# Patient Record
Sex: MALE | Race: WHITE | Employment: FULL TIME
[De-identification: names, ages, dates, MRNs, and addresses within clinical notes are randomized per-mention and may not be internally consistent; named-entity substitution may affect disease eponyms.]

---

## 2022-03-18 PROBLEM — N30.90 CYSTITIS: Status: ACTIVE | Noted: 2017-10-18

## 2022-03-19 PROBLEM — U07.1 COVID-19: Status: ACTIVE | Noted: 2021-08-12

## 2022-03-19 PROBLEM — F33.0 DEPRESSION, MAJOR, RECURRENT, MILD (HCC): Status: ACTIVE | Noted: 2020-11-19

## 2022-03-19 PROBLEM — R55 NEAR SYNCOPE: Status: ACTIVE | Noted: 2018-12-17

## 2022-03-19 PROBLEM — Z20.822 SUSPECTED COVID-19 VIRUS INFECTION: Status: ACTIVE | Noted: 2021-08-12

## 2022-03-19 PROBLEM — F34.1 DYSTHYMIA: Status: ACTIVE | Noted: 2020-03-19

## 2022-03-19 PROBLEM — K13.0 ANGULAR CHEILITIS: Status: ACTIVE | Noted: 2018-06-13

## 2022-03-19 PROBLEM — N52.9 ERECTILE DYSFUNCTION: Status: ACTIVE | Noted: 2020-01-08

## 2022-03-19 PROBLEM — R79.89 LOW TESTOSTERONE IN MALE: Status: ACTIVE | Noted: 2020-02-05

## 2022-03-19 PROBLEM — M25.531 RIGHT WRIST PAIN: Status: ACTIVE | Noted: 2021-05-27

## 2022-03-19 PROBLEM — F33.42 MDD (MAJOR DEPRESSIVE DISORDER), RECURRENT, IN FULL REMISSION (HCC): Status: ACTIVE | Noted: 2021-11-23

## 2022-03-19 PROBLEM — D23.61: Status: ACTIVE | Noted: 2021-05-27

## 2022-03-20 PROBLEM — I10 BENIGN ESSENTIAL HTN: Status: ACTIVE | Noted: 2018-06-13

## 2022-03-20 PROBLEM — E66.3 OVERWEIGHT (BMI 25.0-29.9): Status: ACTIVE | Noted: 2018-12-17

## 2022-05-26 ENCOUNTER — TELEPHONE (OUTPATIENT)
Dept: FAMILY MEDICINE CLINIC | Facility: CLINIC | Age: 47
End: 2022-05-26

## 2022-05-26 DIAGNOSIS — F33.0 DEPRESSION, MAJOR, RECURRENT, MILD (HCC): Primary | ICD-10-CM

## 2022-05-26 DIAGNOSIS — I10 ESSENTIAL (PRIMARY) HYPERTENSION: ICD-10-CM

## 2022-05-26 RX ORDER — CHLORTHALIDONE 25 MG/1
TABLET ORAL
Qty: 90 TABLET | Refills: 1 | OUTPATIENT
Start: 2022-05-26

## 2022-05-26 NOTE — TELEPHONE ENCOUNTER
Sure, I'll call it in now and we can talk about it next week. Just confirm it's lexapro and not prozac, because it looks like I had him on prozac most recently.

## 2022-05-26 NOTE — TELEPHONE ENCOUNTER
Patient called stating Wellbutrin was not working. He has been crying more and his erectile dysfunction has not gotten any better. Would like to know if he could try Lexpro again . Has a follow up scheduled for 5/31.

## 2022-05-27 ENCOUNTER — OFFICE VISIT (OUTPATIENT)
Dept: FAMILY MEDICINE CLINIC | Facility: CLINIC | Age: 47
End: 2022-05-27
Payer: COMMERCIAL

## 2022-05-27 VITALS
WEIGHT: 184 LBS | SYSTOLIC BLOOD PRESSURE: 135 MMHG | HEIGHT: 69 IN | BODY MASS INDEX: 27.25 KG/M2 | HEART RATE: 85 BPM | DIASTOLIC BLOOD PRESSURE: 85 MMHG

## 2022-05-27 DIAGNOSIS — F33.1 MODERATE EPISODE OF RECURRENT MAJOR DEPRESSIVE DISORDER (HCC): Primary | ICD-10-CM

## 2022-05-27 DIAGNOSIS — F68.8 PERSONALITY CHANGE: ICD-10-CM

## 2022-05-27 DIAGNOSIS — F51.04 PSYCHOPHYSIOLOGIC INSOMNIA: ICD-10-CM

## 2022-05-27 DIAGNOSIS — N52.9 ERECTILE DYSFUNCTION, UNSPECIFIED ERECTILE DYSFUNCTION TYPE: ICD-10-CM

## 2022-05-27 DIAGNOSIS — I10 PRIMARY HYPERTENSION: ICD-10-CM

## 2022-05-27 DIAGNOSIS — E29.1 HYPOGONADISM MALE: ICD-10-CM

## 2022-05-27 PROCEDURE — 99214 OFFICE O/P EST MOD 30 MIN: CPT | Performed by: FAMILY MEDICINE

## 2022-05-27 RX ORDER — TRAZODONE HYDROCHLORIDE 100 MG/1
100 TABLET ORAL NIGHTLY PRN
Qty: 90 TABLET | Refills: 1 | Status: SHIPPED | OUTPATIENT
Start: 2022-05-27

## 2022-05-27 RX ORDER — ESCITALOPRAM OXALATE 10 MG/1
10 TABLET ORAL DAILY
Qty: 30 TABLET | Refills: 3 | Status: SHIPPED | OUTPATIENT
Start: 2022-05-27 | End: 2022-08-18

## 2022-05-27 ASSESSMENT — PATIENT HEALTH QUESTIONNAIRE - PHQ9
3. TROUBLE FALLING OR STAYING ASLEEP: 2
10. IF YOU CHECKED OFF ANY PROBLEMS, HOW DIFFICULT HAVE THESE PROBLEMS MADE IT FOR YOU TO DO YOUR WORK, TAKE CARE OF THINGS AT HOME, OR GET ALONG WITH OTHER PEOPLE: 1
7. TROUBLE CONCENTRATING ON THINGS, SUCH AS READING THE NEWSPAPER OR WATCHING TELEVISION: 2
SUM OF ALL RESPONSES TO PHQ QUESTIONS 1-9: 16
2. FEELING DOWN, DEPRESSED OR HOPELESS: 2
8. MOVING OR SPEAKING SO SLOWLY THAT OTHER PEOPLE COULD HAVE NOTICED. OR THE OPPOSITE, BEING SO FIGETY OR RESTLESS THAT YOU HAVE BEEN MOVING AROUND A LOT MORE THAN USUAL: 2
4. FEELING TIRED OR HAVING LITTLE ENERGY: 2
6. FEELING BAD ABOUT YOURSELF - OR THAT YOU ARE A FAILURE OR HAVE LET YOURSELF OR YOUR FAMILY DOWN: 2
SUM OF ALL RESPONSES TO PHQ QUESTIONS 1-9: 16
SUM OF ALL RESPONSES TO PHQ QUESTIONS 1-9: 16
5. POOR APPETITE OR OVEREATING: 2
SUM OF ALL RESPONSES TO PHQ QUESTIONS 1-9: 16
9. THOUGHTS THAT YOU WOULD BE BETTER OFF DEAD, OR OF HURTING YOURSELF: 0
1. LITTLE INTEREST OR PLEASURE IN DOING THINGS: 2
SUM OF ALL RESPONSES TO PHQ9 QUESTIONS 1 & 2: 4

## 2022-05-27 ASSESSMENT — ENCOUNTER SYMPTOMS
SHORTNESS OF BREATH: 0
ABDOMINAL PAIN: 0
CHEST TIGHTNESS: 0
BLOOD IN STOOL: 0

## 2022-05-27 NOTE — PROGRESS NOTES
[place]  _______________________________________  MD Nba Munoz DO Gwenlyn Brush, NP Verlene Kiang, [place]    [address] [facility][note 3], [address] [place] [address]  Phone: [phone]  Fax: [phone]    Radha Henderson (:  1975) is a 41 y.o. male,Established patient, here for evaluation of the following chief complaint(s):  Hypertension and Depression (would like to dicuss changing medication )         ASSESSMENT/PLAN:  1. Moderate episode of recurrent major depressive disorder ([address]  Will get him back on Lexapro and also add trazoadone at night to help with sleep. He's going through a lot. With his constellation of personality changes and his gonadotropic axis abnormalities, I think it's reasonable to also get one MRI of his brain to make sure there is not something going on there that would tie this all together. I explained to him it's a low chance of finding anything but we agree we'd rather be 100% sure. - MRI BRAIN WO CONTRAST; Future  - escitalopram (LEXAPRO) 10 MG tablet; Take 1 tablet by mouth daily  Dispense: 30 tablet; Refill: 3    2. Personality change    - MRI BRAIN WO CONTRAST; Future    3. Erectile dysfunction, unspecified erectile dysfunction type  He will continue T supplementation through urology. 4. Primary hypertension  Stop HTN meds, will recheck on FU. 5. Hypogonadism male    - MRI BRAIN WO CONTRAST; Future    6. Psychophysiologic insomnia    - traZODone (DESYREL) 100 MG tablet; Take 1 tablet by mouth nightly as needed for Sleep  Dispense: 90 tablet; Refill: 1    FU 4w    Subjective   SUBJECTIVE/OBJECTIVE:    Pt with continuing depression struggles in the setting of HTN and ED. We hat switched him off SSRI prozac to Wellbutrin because of sexual side effects. This did not improve bedroom function and his mood was worse on Wellbutrin. He ended up seeing urology and trialed on trimix for the ED.  His wife is not really in to that. There has been a lot of stress int he marriage recently. States Wellbutrin made him overly emotional, having crying spells if just a sad song came on. His wife notes that he did best on Lexapro which he was on years ago. PHQ-9 Total Score: 16 (5/27/2022  8:17 AM)  Thoughts that you would be better off dead, or of hurting yourself in some way: 0 (5/27/2022  8:17 AM)    Vitals:    05/27/22 0815   BP: 135/85   Pulse: 85       BP is controlled on his normal regimen. He has not taken the chlrothalidone in days. He paints a picture of personality and physiologic gonaotropin changes in the last couple years. He denies SI/HI vehemently. Still being treated for low T but is therapeutic. Also having a lot of issues with sleep because of the MDD. Review of Systems   Constitutional: Negative for chills and fever. Respiratory: Negative for chest tightness and shortness of breath. Gastrointestinal: Negative for abdominal pain and blood in stool. Genitourinary: Positive for decreased urine volume. Negative for hematuria. Psychiatric/Behavioral: Positive for dysphoric mood and sleep disturbance. Negative for suicidal ideas. Objective   Physical Exam  Vitals and nursing note reviewed. Constitutional:       General: He is not in acute distress. Appearance: Normal appearance. He is not ill-appearing. HENT:      Head: Normocephalic and atraumatic. Right Ear: External ear normal.      Left Ear: External ear normal.      Mouth/Throat:      Mouth: Mucous membranes are moist.   Eyes:      General: No scleral icterus. Right eye: No discharge. Left eye: No discharge. Extraocular Movements: Extraocular movements intact. Pupils: Pupils are equal, round, and reactive to light. Comments: No visual field defects by confrontation   Cardiovascular:      Rate and Rhythm: Normal rate and regular rhythm. Pulses: Normal pulses.       Heart sounds: No murmur heard.  No friction rub. No gallop. Pulmonary:      Effort: Pulmonary effort is normal. No respiratory distress. Breath sounds: Normal breath sounds. Abdominal:      General: Abdomen is flat. Bowel sounds are normal.      Palpations: Abdomen is soft. Musculoskeletal:         General: No swelling or tenderness. Normal range of motion. Cervical back: Normal range of motion and neck supple. No rigidity. Right lower leg: No edema. Left lower leg: No edema. Skin:     General: Skin is warm and dry. Coloration: Skin is not pale. Neurological:      General: No focal deficit present. Mental Status: He is alert and oriented to person, place, and time. Mental status is at baseline. Psychiatric:         Mood and Affect: Mood normal.         Behavior: Behavior normal.         Thought Content: Thought content normal.                  An electronic signature was used to authenticate this note.     --Renetta Louis MD

## 2022-06-06 ENCOUNTER — HOSPITAL ENCOUNTER (OUTPATIENT)
Dept: MRI IMAGING | Age: 47
Discharge: HOME | End: 2022-06-09
Payer: COMMERCIAL

## 2022-06-06 DIAGNOSIS — F68.8 PERSONALITY CHANGE: ICD-10-CM

## 2022-06-06 DIAGNOSIS — E29.1 HYPOGONADISM MALE: ICD-10-CM

## 2022-06-06 DIAGNOSIS — F33.1 MODERATE EPISODE OF RECURRENT MAJOR DEPRESSIVE DISORDER (HCC): ICD-10-CM

## 2022-06-06 PROCEDURE — 70551 MRI BRAIN STEM W/O DYE: CPT

## 2022-06-22 ENCOUNTER — OFFICE VISIT (OUTPATIENT)
Dept: FAMILY MEDICINE CLINIC | Facility: CLINIC | Age: 47
End: 2022-06-22
Payer: COMMERCIAL

## 2022-06-22 VITALS
BODY MASS INDEX: 26.59 KG/M2 | RESPIRATION RATE: 14 BRPM | SYSTOLIC BLOOD PRESSURE: 128 MMHG | HEART RATE: 87 BPM | WEIGHT: 179.5 LBS | HEIGHT: 69 IN | DIASTOLIC BLOOD PRESSURE: 74 MMHG | OXYGEN SATURATION: 99 %

## 2022-06-22 DIAGNOSIS — R04.0 EPISTAXIS: ICD-10-CM

## 2022-06-22 DIAGNOSIS — I10 ESSENTIAL (PRIMARY) HYPERTENSION: ICD-10-CM

## 2022-06-22 DIAGNOSIS — F51.04 PSYCHOPHYSIOLOGIC INSOMNIA: ICD-10-CM

## 2022-06-22 DIAGNOSIS — F33.1 MODERATE EPISODE OF RECURRENT MAJOR DEPRESSIVE DISORDER (HCC): Primary | ICD-10-CM

## 2022-06-22 PROCEDURE — 99214 OFFICE O/P EST MOD 30 MIN: CPT | Performed by: FAMILY MEDICINE

## 2022-06-22 ASSESSMENT — PATIENT HEALTH QUESTIONNAIRE - PHQ9
3. TROUBLE FALLING OR STAYING ASLEEP: 0
SUM OF ALL RESPONSES TO PHQ QUESTIONS 1-9: 0
10. IF YOU CHECKED OFF ANY PROBLEMS, HOW DIFFICULT HAVE THESE PROBLEMS MADE IT FOR YOU TO DO YOUR WORK, TAKE CARE OF THINGS AT HOME, OR GET ALONG WITH OTHER PEOPLE: 0
6. FEELING BAD ABOUT YOURSELF - OR THAT YOU ARE A FAILURE OR HAVE LET YOURSELF OR YOUR FAMILY DOWN: 0
SUM OF ALL RESPONSES TO PHQ QUESTIONS 1-9: 0
4. FEELING TIRED OR HAVING LITTLE ENERGY: 0
7. TROUBLE CONCENTRATING ON THINGS, SUCH AS READING THE NEWSPAPER OR WATCHING TELEVISION: 0
9. THOUGHTS THAT YOU WOULD BE BETTER OFF DEAD, OR OF HURTING YOURSELF: 0
8. MOVING OR SPEAKING SO SLOWLY THAT OTHER PEOPLE COULD HAVE NOTICED. OR THE OPPOSITE, BEING SO FIGETY OR RESTLESS THAT YOU HAVE BEEN MOVING AROUND A LOT MORE THAN USUAL: 0
SUM OF ALL RESPONSES TO PHQ QUESTIONS 1-9: 0
5. POOR APPETITE OR OVEREATING: 0
SUM OF ALL RESPONSES TO PHQ QUESTIONS 1-9: 0
2. FEELING DOWN, DEPRESSED OR HOPELESS: 0

## 2022-06-22 ASSESSMENT — ENCOUNTER SYMPTOMS
SHORTNESS OF BREATH: 0
ABDOMINAL PAIN: 0
BLOOD IN STOOL: 0
CHEST TIGHTNESS: 0

## 2022-06-22 NOTE — PROGRESS NOTES
[place]  _______________________________________  MD Tony Johns, LAUREN Zepeda MD Charleen Shoals, [facility] [address]  Phone: [phone]  Fax: [phone]    Baldomero Ferro (:  1975) is a 41 y.o. male,Established patient, here for evaluation of the following chief complaint(s):  Discuss Medications (pt states he likes his new meds and feels better)         ASSESSMENT/PLAN:    1. Moderate episode of recurrent major depressive disorder (HCC)  Doing much better, continue counseling, no med changes for now. Recheck in 6m. 2. Psychophysiologic insomnia  Stable, continue current regimen. 3. Essential (primary) hypertension  Off meds, BP nominal. Will monitor. 4. Epistaxis  Asked him to start using bedside humidifier. Showed him how to staunch bleeding. To ER with bleeding that won't stop. Sending to ENT for full nasal scope and possible cautery. Appreciate their help.   - [address], [place]     FU 6m    Subjective   SUBJECTIVE/OBJECTIVE:    May '22:  Pt with continuing depression struggles in the setting of HTN and ED. We hat switched him off SSRI prozac to Wellbutrin because of sexual side effects. This did not improve bedroom function and his mood was worse on Wellbutrin. He ended up seeing urology and trialed on trimix for the ED. His wife is not really in to that. There has been a lot of stress int he marriage recently. States Wellbutrin made him overly emotional, having crying spells if just a sad song came on. His wife notes that he did best on Lexapro which he was on years ago. Today:   We added back lexapro and also put him on Trazodone 100 a tnight to help with sleep. He is feeling much better. BP remains controlled off meds.      MRI of the brain was normal.     PHQ-9 Total Score: 0 (2022  3:09 PM)  Thoughts that you would be better off dead, or of hurting yourself in some way: 0 (6/22/2022  3:09 PM)    Vitals:    06/22/22 1511   BP: 128/74   Pulse: 87   Resp: 14   SpO2: 99%       BP is controlled on his normal regimen. He has not taken the chlrothalidone in days. He paints a picture of personality and physiologic gonaotropin changes in the last couple years. He denies SI/HI vehemently. Still being treated for low T but is therapeutic. Also having a lot of issues with sleep because of the MDD. Epistaxis: Had heat exhaustion in a container he was unloaded that was 130F. Had a major nosebleed when this happened. Review of Systems   Constitutional: Negative for chills and fever. HENT: Positive for nosebleeds. Respiratory: Negative for chest tightness and shortness of breath. Gastrointestinal: Negative for abdominal pain and blood in stool. Genitourinary: Negative for hematuria. Psychiatric/Behavioral: Negative for dysphoric mood, sleep disturbance and suicidal ideas. Objective   Physical Exam  Vitals and nursing note reviewed. Constitutional:       General: He is not in acute distress. Appearance: Normal appearance. He is not ill-appearing. HENT:      Head: Normocephalic and atraumatic. Right Ear: External ear normal.      Left Ear: External ear normal.      Nose:      Comments: Dried blood BL nares as far up as I can see. Visible nasal septum is friable     Mouth/Throat:      Mouth: Mucous membranes are moist.   Eyes:      General: No scleral icterus. Right eye: No discharge. Left eye: No discharge. Extraocular Movements: Extraocular movements intact. Pupils: Pupils are equal, round, and reactive to light. Cardiovascular:      Rate and Rhythm: Normal rate and regular rhythm. Pulses: Normal pulses. Heart sounds: No murmur heard. No friction rub. No gallop. Pulmonary:      Effort: Pulmonary effort is normal. No respiratory distress. Breath sounds: Normal breath sounds. Abdominal:      General: Abdomen is flat. Bowel sounds are normal.      Palpations: Abdomen is soft. Musculoskeletal:         General: No swelling or tenderness. Normal range of motion. Cervical back: Normal range of motion and neck supple. No rigidity. Right lower leg: No edema. Left lower leg: No edema. Skin:     General: Skin is warm and dry. Coloration: Skin is not pale. Neurological:      General: No focal deficit present. Mental Status: He is alert and oriented to person, place, and time. Mental status is at baseline. Psychiatric:         Mood and Affect: Mood normal.         Behavior: Behavior normal.         Thought Content: Thought content normal.                  An electronic signature was used to authenticate this note.     --Rajani Berumen MD

## 2022-07-05 ENCOUNTER — TELEPHONE (OUTPATIENT)
Dept: FAMILY MEDICINE CLINIC | Facility: CLINIC | Age: 47
End: 2022-07-05

## 2022-07-05 NOTE — TELEPHONE ENCOUNTER
Absolutely,     If that is the dose that ends up working, let us know so we can send in the appropriate number of pills on his next refill. Thanks.

## 2022-07-14 ENCOUNTER — OFFICE VISIT (OUTPATIENT)
Dept: ENT CLINIC | Age: 47
End: 2022-07-14
Payer: COMMERCIAL

## 2022-07-14 VITALS
DIASTOLIC BLOOD PRESSURE: 80 MMHG | SYSTOLIC BLOOD PRESSURE: 110 MMHG | WEIGHT: 182.2 LBS | HEIGHT: 69 IN | BODY MASS INDEX: 26.98 KG/M2

## 2022-07-14 DIAGNOSIS — J34.2 DEVIATED NASAL SEPTUM: ICD-10-CM

## 2022-07-14 DIAGNOSIS — R04.0 EPISTAXIS: Primary | ICD-10-CM

## 2022-07-14 PROCEDURE — 99204 OFFICE O/P NEW MOD 45 MIN: CPT | Performed by: PHYSICIAN ASSISTANT

## 2022-07-14 PROCEDURE — 31231 NASAL ENDOSCOPY DX: CPT | Performed by: PHYSICIAN ASSISTANT

## 2022-07-14 ASSESSMENT — ENCOUNTER SYMPTOMS
ABDOMINAL PAIN: 0
EYE DISCHARGE: 0
COUGH: 0

## 2022-07-14 NOTE — PROGRESS NOTES
Chief Complaint   Patient presents with    New Patient     Patient here for nose bleeds.  Epistaxis       HPI:  [place] is a 41 y.o. male seen for evaluation of epistaxis. He reports that this started 3 weeks ago. He states that there was an incident in which he was unloading a greater than 100 degree trailer and he was about to pass out, and he was taken to a cool breakroom. He reports that once he was in the breakroom, he had a bad nose bleed. He states that since that time he has not been able to breath well from the right side of his nose. He states that he has been using flonase on occasion. He denies any direct trauma to the nose. He denies any cautery or packing in the nose. He denies any prior nasal surgery. Past Medical History, Past Surgical History, Family history, Social History, and Medications were all reviewed with the patient today and updated as necessary. Allergies   Allergen Reactions    Lisinopril Other (See Comments)     Large increase (40%) in Creatinine with GFR reduced by 35%.  Penicillin G Other (See Comments)     Told had allergie by parents      Patient Active Problem List   Diagnosis    Allergic rhinitis    Cystitis    Angular cheilitis    COVID-19    Suspected COVID-19 virus infection    Benign neoplasm of skin of right upper extremity    Gastroesophageal reflux disease without esophagitis    Near syncope    Depression, major, recurrent, mild (HCC)    Hx of syncope    Low testosterone in male    Right wrist pain    Dysthymia    MDD (major depressive disorder), recurrent, in full remission ([address]    Erectile dysfunction    Anxiety    Benign essential HTN    Overweight (BMI 25.0-29. 9)    Moderate episode of recurrent major depressive disorder ([address]    Personality change    Essential (primary) hypertension    Hypogonadism male    Psychophysiologic insomnia    Epistaxis     Current Outpatient Medications   Medication Sig    mupirocin (BACTROBAN NASAL) 2 % nasal ointment Apply to the right side of nose 2 times daily for 10 days.  escitalopram (LEXAPRO) 10 MG tablet Take 1 tablet by mouth daily    traZODone (DESYREL) 100 MG tablet Take 1 tablet by mouth nightly as needed for Sleep    testosterone cypionate (DEPOTESTOTERONE CYPIONATE) 200 MG/ML injection INJECT 0.3 ML (60MG) BY INTRAMUSCULAR ROUTE EVERY Monday AND Thursday. MAX DAILY AMOUNT: 60 MG.  Testosterone Enanthate 50 MG/0.5ML SOAJ Inject 0.6 mLs into the muscle Twice a Week     No current facility-administered medications for this visit. Past Medical History:   Diagnosis Date    Erectile dysfunction      Social History     Tobacco Use    Smoking status: Never Smoker    Smokeless tobacco: Never Used   Substance Use Topics    Alcohol use: Yes     Past Surgical History:   Procedure Laterality Date    APPENDECTOMY  1995    CHOLECYSTECTOMY  2008    LAP,CHOLECYSTECTOMY      [place] APPENDECTOMY       Family History   Problem Relation Age of Onset    No Known Problems Brother     Hypertension Father     No Known Problems Mother     Diabetes Father         ROS:    Review of Systems   Constitutional: Negative for fever. HENT: Positive for nosebleeds. Negative for ear discharge and ear pain. Eyes: Negative for discharge. Respiratory: Negative for cough. Cardiovascular: Negative for chest pain. Gastrointestinal: Negative for abdominal pain. Genitourinary: Negative for difficulty urinating. Musculoskeletal: Negative for neck pain. Skin: Negative for rash. Allergic/Immunologic: Negative for environmental allergies. Neurological: Negative for dizziness. Hematological: Negative for adenopathy. Psychiatric/Behavioral: Negative for agitation.            PHYSICAL EXAM:    /80 (Site: Left Upper Arm, Position: Sitting)   Ht 5' 9\" (1.753 m)   Wt 182 lb 3.2 oz (82.6 kg)   BMI 26.91 kg/m²     Head  Head and Face - The head and face are atraumatic, normocephalic. The salivary glands are intact and the facial appearance is symmetric. Head shape - No scars, lesions, or masses    Ear  Ear - Tympanic membranes are clear, the external auditory canal is without discharge and the tympanic membranes are mobile. There is no tympanic membrane erythema and no middle ear opacity is visualized. Pinna: bilateral - No hematomas or lacerations    Eye  Eyeball - bilateral - extraocular motions intact, equal in size and movement    Nose and Sinuses  Nose - mucosa is pink and the septum is deviated. There are no nasal lesions and there was mild turbinate hypertrophy. Mouth and Throat  Lips - upper lip - normal: no dryness, cracking, pallor, cyanosis, or vesicular eruption. Lower lip: normal: no dryness, cracking, pallor, cyanosis, or vesicular eruption. Teeth and Gums - No bleeding, no inflammation or ulceration. Lips - Pink and symmetrical  Oral Cavity - Oral mucosa pink, soft and hard palates contiguous and tongue moist without ulcers. The mucosa is without ulcerations. No oral cavity masses present. Parotid Gland - Bilateral - Non tender, not swollen. Oropharynx - No discharge or Erythema  Nasopharynx - Non obstructed, mucosa pink and moist.    Hypopharynx - No erythema  Submandibular Gland - Non tender, not swollen. Tonsils - Normal    Neck   Neck - Full range of motion and Supple. Non Tender. No Masses. Trachea - Midline. Thyroid - Gland - Symmetric. Non Tender. Nodules - No nodules. Neurologic - II - XII Grossly intact bilaterally    Cardiac  Inspection - Jugular Vein:  Bilateral - non distended, no prominent pulsations    Chest and Lung  Inspection - Movements:  Chest symmetrical with bilateral expansion, respirations even and non labored    Nasal Endoscopy Procedure Note      Indications   A nasal endoscopy will be performed due to epistaxis.     Procedure Details   The risks, benefits, complications, treatment options, and expected outcomes were discussed with the patient. The patient concurred with the proposed plan, giving informed consent. The interior nasal cavity, middle and superior meatus, turbinates and sphenoethmoidal recess were evaluated. Nasal endoscopy was performed today under Rhinocaine was applied topically. Findings  The findings include: the septum was deviated without perforation, the turbinates were hypertrophic, there were no polyps, masses, or mucopurulence identified and prominent vasculature at the anterior right septum with mild irritation and crusting. Patient Response   The patient tolerated the procedure well. .            Procedure Impression  Deviated septum  Turbinate hypertrophy  Epistaxis      Procedure:    Simple Epistaxis Control -  The procedure, risks and benefits were discussed with the patient and they was agreeable to the procedure. The right side of the nose was evaluated and there were prominent blood vessels on the right anterior septum. There is no active bleeding. Silver nitrate was used to cauterize the prominent blood vessels. There was no blood loss. They tolerated this well. ASSESSMENT and PLAN      ICD-10-CM    1. Epistaxis  R04.0 NASAL ENDOSCOPY,DX   2. Deviated nasal septum  J34.2        The anterior septum was cauterized, there were no areas of bleeding posteriorly. I will send in Bactroban ointment for the patient to use on the right anterior septum. He was also encouraged to use nasal saline spray 2-3 times a day. He is to avoid blowing his nose for the next 2 weeks. He will return in 2 weeks for reevaluation. Thank you for the opportunity to participate in the care of this patient. Please let me know if you have any further questions or concerns.     Jessa Dasilva PA-C  7/14/2022

## 2022-07-28 ENCOUNTER — OFFICE VISIT (OUTPATIENT)
Dept: ENT CLINIC | Age: 47
End: 2022-07-28
Payer: COMMERCIAL

## 2022-07-28 VITALS — WEIGHT: 184 LBS | BODY MASS INDEX: 27.25 KG/M2 | OXYGEN SATURATION: 98 % | HEIGHT: 69 IN

## 2022-07-28 DIAGNOSIS — J34.3 NASAL TURBINATE HYPERTROPHY: ICD-10-CM

## 2022-07-28 DIAGNOSIS — R04.0 EPISTAXIS: ICD-10-CM

## 2022-07-28 DIAGNOSIS — J34.2 DEVIATED NASAL SEPTUM: Primary | ICD-10-CM

## 2022-07-28 PROCEDURE — 99213 OFFICE O/P EST LOW 20 MIN: CPT | Performed by: PHYSICIAN ASSISTANT

## 2022-07-28 ASSESSMENT — ENCOUNTER SYMPTOMS
EYE DISCHARGE: 0
COUGH: 0
ABDOMINAL PAIN: 0

## 2022-07-28 NOTE — PROGRESS NOTES
Chief Complaint   Patient presents with    Follow-up     Patient here for follow-up on his nose bleed. Patient has had no more nose bleeds . He states that he still can't breathe out of the R side. HPI:  Brayan Gallagher is a 41 y.o. male seen to follow up on his nose. He reports that he has not experienced any nosebleeds since the last visit, but he is not able to breath from the right side of his nose. Patient reports that he has been using nasal saline spray and vaseline in the nose. Past Medical History, Past Surgical History, Family history, Social History, and Medications were all reviewed with the patient today and updated as necessary. Allergies   Allergen Reactions    Lisinopril Other (See Comments)     Large increase (40%) in Creatinine with GFR reduced by 35%. Penicillin G Other (See Comments)     Told had allergie by parents      Patient Active Problem List   Diagnosis    Allergic rhinitis    Cystitis    Angular cheilitis    COVID-19    Suspected COVID-19 virus infection    Benign neoplasm of skin of right upper extremity    Gastroesophageal reflux disease without esophagitis    Near syncope    Depression, major, recurrent, mild (HCC)    Hx of syncope    Low testosterone in male    Right wrist pain    Dysthymia    MDD (major depressive disorder), recurrent, in full remission ([address]    Erectile dysfunction    Anxiety    Benign essential HTN    Overweight (BMI 25.0-29. 9)    Moderate episode of recurrent major depressive disorder ([address]    Personality change    Essential (primary) hypertension    Hypogonadism male    Psychophysiologic insomnia    Epistaxis     Current Outpatient Medications   Medication Sig    mupirocin (BACTROBAN NASAL) 2 % nasal ointment Apply to the right side of nose 2 times daily for 10 days.     escitalopram (LEXAPRO) 10 MG tablet Take 1 tablet by mouth daily    traZODone (DESYREL) 100 MG tablet Take 1 tablet by mouth nightly as needed for Sleep    testosterone cypionate (DEPOTESTOTERONE CYPIONATE) 200 MG/ML injection INJECT 0.3 ML (60MG) BY INTRAMUSCULAR ROUTE EVERY Monday AND Thursday. MAX DAILY AMOUNT: 60 MG. Testosterone Enanthate 50 MG/0.5ML SOAJ Inject 0.6 mLs into the muscle Twice a Week     No current facility-administered medications for this visit. Past Medical History:   Diagnosis Date    Erectile dysfunction      Social History     Tobacco Use    Smoking status: Never    Smokeless tobacco: Never   Substance Use Topics    Alcohol use: Yes     Past Surgical History:   Procedure Laterality Date    APPENDECTOMY  1995    CHOLECYSTECTOMY  2008    LAP,CHOLECYSTECTOMY      [place] APPENDECTOMY       Family History   Problem Relation Age of Onset    No Known Problems Brother     Hypertension Father     No Known Problems Mother     Diabetes Father         ROS:    Review of Systems   Constitutional:  Negative for fever. HENT:  Negative for ear discharge and ear pain. Eyes:  Negative for discharge. Respiratory:  Negative for cough. Cardiovascular:  Negative for chest pain. Gastrointestinal:  Negative for abdominal pain. Genitourinary:  Negative for difficulty urinating. Musculoskeletal:  Negative for neck pain. Skin:  Negative for rash. Allergic/Immunologic: Negative for environmental allergies. Neurological:  Negative for dizziness. Hematological:  Negative for adenopathy. Psychiatric/Behavioral:  Negative for agitation. PHYSICAL EXAM:    Ht 5' 9\" (1.753 m)   Wt 184 lb (83.5 kg)   SpO2 98%   BMI 27.17 kg/m²     Head  Head and Face - The head and face are atraumatic, normocephalic. The salivary glands are intact and the facial appearance is symmetric. Head shape - No scars, lesions, or masses    Ear  Ear - Tympanic membranes are clear, the external auditory canal is without discharge and the tympanic membranes are mobile. There is no tympanic membrane erythema and no middle ear opacity is visualized.     Pinna: bilateral - No

## 2022-08-18 DIAGNOSIS — F33.1 MODERATE EPISODE OF RECURRENT MAJOR DEPRESSIVE DISORDER (HCC): ICD-10-CM

## 2022-08-18 RX ORDER — ESCITALOPRAM OXALATE 10 MG/1
10 TABLET ORAL DAILY
Qty: 90 TABLET | Refills: 1 | Status: SHIPPED | OUTPATIENT
Start: 2022-08-18

## 2022-08-23 DIAGNOSIS — F51.04 PSYCHOPHYSIOLOGIC INSOMNIA: ICD-10-CM

## 2022-08-23 RX ORDER — TRAZODONE HYDROCHLORIDE 100 MG/1
TABLET ORAL
Qty: 90 TABLET | Refills: 1 | OUTPATIENT
Start: 2022-08-23

## 2022-10-10 RX ORDER — BUPROPION HYDROCHLORIDE 150 MG/1
TABLET ORAL
Qty: 90 TABLET | OUTPATIENT
Start: 2022-10-10

## 2022-11-07 ENCOUNTER — TELEPHONE (OUTPATIENT)
Dept: UROLOGY | Age: 47
End: 2022-11-07

## 2022-11-07 DIAGNOSIS — R79.89 LOW TESTOSTERONE: Primary | ICD-10-CM

## 2022-11-08 RX ORDER — TESTOSTERONE CYPIONATE 200 MG/ML
INJECTION INTRAMUSCULAR
Qty: 4 ML | OUTPATIENT
Start: 2022-11-08

## 2022-12-21 ENCOUNTER — OFFICE VISIT (OUTPATIENT)
Dept: FAMILY MEDICINE CLINIC | Facility: CLINIC | Age: 47
End: 2022-12-21
Payer: COMMERCIAL

## 2022-12-21 VITALS
SYSTOLIC BLOOD PRESSURE: 143 MMHG | BODY MASS INDEX: 27.4 KG/M2 | HEIGHT: 69 IN | WEIGHT: 185 LBS | DIASTOLIC BLOOD PRESSURE: 95 MMHG

## 2022-12-21 DIAGNOSIS — K21.9 GASTROESOPHAGEAL REFLUX DISEASE WITHOUT ESOPHAGITIS: ICD-10-CM

## 2022-12-21 DIAGNOSIS — F51.04 PSYCHOPHYSIOLOGIC INSOMNIA: ICD-10-CM

## 2022-12-21 DIAGNOSIS — I10 BENIGN ESSENTIAL HTN: Primary | ICD-10-CM

## 2022-12-21 DIAGNOSIS — F33.1 MODERATE EPISODE OF RECURRENT MAJOR DEPRESSIVE DISORDER (HCC): ICD-10-CM

## 2022-12-21 DIAGNOSIS — I10 BENIGN ESSENTIAL HTN: ICD-10-CM

## 2022-12-21 LAB
ALBUMIN SERPL-MCNC: 4.1 G/DL (ref 3.5–5)
ALBUMIN/GLOB SERPL: 1.2 (ref 0.4–1.6)
ALP SERPL-CCNC: 74 U/L (ref 50–136)
ALT SERPL-CCNC: 29 U/L (ref 12–65)
ANION GAP SERPL CALC-SCNC: 3 MMOL/L (ref 2–11)
AST SERPL-CCNC: 14 U/L (ref 15–37)
BASOPHILS # BLD: 0 K/UL (ref 0–0.2)
BASOPHILS NFR BLD: 0 % (ref 0–2)
BILIRUB SERPL-MCNC: 0.8 MG/DL (ref 0.2–1.1)
BUN SERPL-MCNC: 11 MG/DL (ref 6–23)
CALCIUM SERPL-MCNC: 9 MG/DL (ref 8.3–10.4)
CHLORIDE SERPL-SCNC: 103 MMOL/L (ref 101–110)
CO2 SERPL-SCNC: 33 MMOL/L (ref 21–32)
CREAT SERPL-MCNC: 1.2 MG/DL (ref 0.8–1.5)
DIFFERENTIAL METHOD BLD: ABNORMAL
EOSINOPHIL # BLD: 0.2 K/UL (ref 0–0.8)
EOSINOPHIL NFR BLD: 3 % (ref 0.5–7.8)
ERYTHROCYTE [DISTWIDTH] IN BLOOD BY AUTOMATED COUNT: 12.2 % (ref 11.9–14.6)
GLOBULIN SER CALC-MCNC: 3.5 G/DL (ref 2.8–4.5)
GLUCOSE SERPL-MCNC: 88 MG/DL (ref 65–100)
HCT VFR BLD AUTO: 49.7 % (ref 41.1–50.3)
HGB BLD-MCNC: 16.5 G/DL (ref 13.6–17.2)
IMM GRANULOCYTES # BLD AUTO: 0 K/UL (ref 0–0.5)
IMM GRANULOCYTES NFR BLD AUTO: 0 % (ref 0–5)
LYMPHOCYTES # BLD: 1.4 K/UL (ref 0.5–4.6)
LYMPHOCYTES NFR BLD: 20 % (ref 13–44)
MCH RBC QN AUTO: 30.1 PG (ref 26.1–32.9)
MCHC RBC AUTO-ENTMCNC: 33.2 G/DL (ref 31.4–35)
MCV RBC AUTO: 90.7 FL (ref 82–102)
MONOCYTES # BLD: 0.7 K/UL (ref 0.1–1.3)
MONOCYTES NFR BLD: 10 % (ref 4–12)
NEUTS SEG # BLD: 4.7 K/UL (ref 1.7–8.2)
NEUTS SEG NFR BLD: 67 % (ref 43–78)
NRBC # BLD: 0 K/UL (ref 0–0.2)
PLATELET # BLD AUTO: 256 K/UL (ref 150–450)
PMV BLD AUTO: 9.2 FL (ref 9.4–12.3)
POTASSIUM SERPL-SCNC: 3.8 MMOL/L (ref 3.5–5.1)
PROT SERPL-MCNC: 7.6 G/DL (ref 6.3–8.2)
RBC # BLD AUTO: 5.48 M/UL (ref 4.23–5.6)
SODIUM SERPL-SCNC: 139 MMOL/L (ref 133–143)
WBC # BLD AUTO: 7 K/UL (ref 4.3–11.1)

## 2022-12-21 PROCEDURE — 3074F SYST BP LT 130 MM HG: CPT | Performed by: FAMILY MEDICINE

## 2022-12-21 PROCEDURE — 3078F DIAST BP <80 MM HG: CPT | Performed by: FAMILY MEDICINE

## 2022-12-21 PROCEDURE — 99214 OFFICE O/P EST MOD 30 MIN: CPT | Performed by: FAMILY MEDICINE

## 2022-12-21 RX ORDER — ESCITALOPRAM OXALATE 10 MG/1
10 TABLET ORAL DAILY
Qty: 90 TABLET | Refills: 1 | Status: SHIPPED | OUTPATIENT
Start: 2022-12-21

## 2022-12-21 RX ORDER — TRAZODONE HYDROCHLORIDE 100 MG/1
100 TABLET ORAL NIGHTLY PRN
Qty: 90 TABLET | Refills: 1 | Status: SHIPPED | OUTPATIENT
Start: 2022-12-21

## 2022-12-21 ASSESSMENT — PATIENT HEALTH QUESTIONNAIRE - PHQ9
SUM OF ALL RESPONSES TO PHQ QUESTIONS 1-9: 0
SUM OF ALL RESPONSES TO PHQ9 QUESTIONS 1 & 2: 0
SUM OF ALL RESPONSES TO PHQ QUESTIONS 1-9: 0
7. TROUBLE CONCENTRATING ON THINGS, SUCH AS READING THE NEWSPAPER OR WATCHING TELEVISION: 0
1. LITTLE INTEREST OR PLEASURE IN DOING THINGS: 0
SUM OF ALL RESPONSES TO PHQ QUESTIONS 1-9: 0
5. POOR APPETITE OR OVEREATING: 0
4. FEELING TIRED OR HAVING LITTLE ENERGY: 0
6. FEELING BAD ABOUT YOURSELF - OR THAT YOU ARE A FAILURE OR HAVE LET YOURSELF OR YOUR FAMILY DOWN: 0
3. TROUBLE FALLING OR STAYING ASLEEP: 0
9. THOUGHTS THAT YOU WOULD BE BETTER OFF DEAD, OR OF HURTING YOURSELF: 0
2. FEELING DOWN, DEPRESSED OR HOPELESS: 0
8. MOVING OR SPEAKING SO SLOWLY THAT OTHER PEOPLE COULD HAVE NOTICED. OR THE OPPOSITE, BEING SO FIGETY OR RESTLESS THAT YOU HAVE BEEN MOVING AROUND A LOT MORE THAN USUAL: 0
SUM OF ALL RESPONSES TO PHQ QUESTIONS 1-9: 0

## 2022-12-21 ASSESSMENT — ENCOUNTER SYMPTOMS
CHEST TIGHTNESS: 0
SHORTNESS OF BREATH: 0
ABDOMINAL PAIN: 1
ABDOMINAL DISTENTION: 1
BLOOD IN STOOL: 0

## 2022-12-21 NOTE — PROGRESS NOTES
[place]  _______________________________________  Carmela Cruz MD    May Kay, DO  Kian Tati, MD Zara Falcon MD    [address] , [address] [facility]  Phone: [phone]  Fax: [phone]    Mikayla Pleitez (:  1975) is a 41 y.o. male,Established patient, here for evaluation of the following chief complaint(s):  Depression and Gastroesophageal Reflux         ASSESSMENT/PLAN:  1. Psychophysiologic insomnia  Not sleeping, restart Trazodone. - traZODone (DESYREL) 100 MG tablet; Take 1 tablet by mouth nightly as needed for Sleep  Dispense: 90 tablet; Refill: 1    2. Moderate episode of recurrent major depressive disorder (HCC)  Stable, continue current regimen. He contracts for safety. - escitalopram (LEXAPRO) 10 MG tablet; Take 1 tablet by mouth daily  Dispense: 90 tablet; Refill: 1    3. Benign essential HTN  BP a little high today, but he hasn't slept well in days. When he's sleeping bP is great. Restarting meds for that as above, will trend out BP next OV.   - Comprehensive Metabolic Panel; Future    4. Gastroesophageal reflux disease without esophagitis  Mild, start OTC pepcid. If not controlled in a month switch to Nexium. If that's not helping FU with me. Reviewed signs of GIB with him. - CBC with Auto Differential; Future      FU 6m    Subjective   SUBJECTIVE/OBJECTIVE:    May '22:  Pt with continuing depression struggles in the setting of HTN and ED. We hat switched him off SSRI prozac to Wellbutrin because of sexual side effects. This did not improve bedroom function and his mood was worse on Wellbutrin. He ended up seeing urology and trialed on trimix for the ED. His wife is not really in to that. There has been a lot of stress int he marriage recently. States Wellbutrin made him overly emotional, having crying spells if just a sad song came on. His wife notes that he did best on Lexapro which he was on years ago. pulses. Heart sounds: No murmur heard. No friction rub. No gallop. Pulmonary:      Effort: Pulmonary effort is normal. No respiratory distress. Breath sounds: Normal breath sounds. Abdominal:      General: Abdomen is flat. Bowel sounds are normal.      Palpations: Abdomen is soft. Tenderness: There is abdominal tenderness. Comments: TTP mid epigastrium   Musculoskeletal:         General: No swelling or tenderness. Normal range of motion. Cervical back: Normal range of motion and neck supple. No rigidity. Right lower leg: No edema. Left lower leg: No edema. Skin:     General: Skin is warm and dry. Coloration: Skin is not pale. Neurological:      General: No focal deficit present. Mental Status: He is alert and oriented to person, place, and time. Mental status is at baseline. Psychiatric:         Mood and Affect: Mood normal.         Behavior: Behavior normal.         Thought Content: Thought content normal.                An electronic signature was used to authenticate this note.     --Cristi Leyva MD

## 2023-02-01 DIAGNOSIS — R79.89 LOW TESTOSTERONE: Primary | ICD-10-CM

## 2023-02-02 ENCOUNTER — NURSE ONLY (OUTPATIENT)
Dept: UROLOGY | Age: 48
End: 2023-02-02

## 2023-02-02 DIAGNOSIS — R79.89 LOW TESTOSTERONE: ICD-10-CM

## 2023-02-03 LAB — PSA SERPL-MCNC: 1.1 NG/ML

## 2023-02-07 LAB
TESTOST FREE SERPL-MCNC: 39.2 PG/ML (ref 6.8–21.5)
TESTOST SERPL-MCNC: 926 NG/DL (ref 264–916)

## 2023-03-01 ENCOUNTER — OFFICE VISIT (OUTPATIENT)
Dept: UROLOGY | Age: 48
End: 2023-03-01
Payer: COMMERCIAL

## 2023-03-01 DIAGNOSIS — R79.89 LOW TESTOSTERONE: Primary | ICD-10-CM

## 2023-03-01 DIAGNOSIS — N52.9 ERECTILE DYSFUNCTION, UNSPECIFIED ERECTILE DYSFUNCTION TYPE: ICD-10-CM

## 2023-03-01 LAB
BILIRUBIN, URINE, POC: NEGATIVE
BLOOD URINE, POC: NORMAL
GLUCOSE URINE, POC: NEGATIVE
KETONES, URINE, POC: NEGATIVE
LEUKOCYTE ESTERASE, URINE, POC: NEGATIVE
NITRITE, URINE, POC: NEGATIVE
PH, URINE, POC: 7 (ref 4.6–8)
PROTEIN,URINE, POC: NORMAL
SPECIFIC GRAVITY, URINE, POC: 1.02 (ref 1–1.03)
URINALYSIS CLARITY, POC: NORMAL
URINALYSIS COLOR, POC: NORMAL
UROBILINOGEN, POC: NORMAL

## 2023-03-01 PROCEDURE — 99213 OFFICE O/P EST LOW 20 MIN: CPT | Performed by: UROLOGY

## 2023-03-01 PROCEDURE — 81003 URINALYSIS AUTO W/O SCOPE: CPT | Performed by: UROLOGY

## 2023-03-01 RX ORDER — TESTOSTERONE CYPIONATE 200 MG/ML
INJECTION INTRAMUSCULAR
Qty: 8 ML | Refills: 3 | Status: SHIPPED | OUTPATIENT
Start: 2023-03-01 | End: 2024-03-01

## 2023-03-01 ASSESSMENT — ENCOUNTER SYMPTOMS: NAUSEA: 0

## 2023-03-01 NOTE — PROGRESS NOTES
[facility]  [address]    [address], [address] [facility]  [phone]          Anya Nuñez  : 1975    Chief Complaint   Patient presents with    Follow-up          HPI     Anya Nuñez is a 41 y.o. male (GUERRA-lurd) referred by Dr. Priyanka Rivera in regards to low T and ED. He previously used 4 pumps androgel daily, T only increased to 320. He continues to have issues with energy and low libido. In regards to ED, he was using 100 mg sildenafil with little success. He went to see an MD in [place] who switched T replacement to 120 mg twice weekly injections and switched ED treatment to a daily bonita of 50 mg sildenafil/20mg tadalafil. He is NOW on 200 mg/ml depoT and uses 0.3 ml (60 mg) TWICE weekly (Mon/Thurs). In regards to ed, he is using an otc supplement once weekly and it is working. Testosterone: 20 320,  621,  582, 23 926     Hct:  47.3,  52.1,  52.6, 22 49.7     PSA:  1.0,  1.2, 23 1.1          Past Medical History:   Diagnosis Date    Erectile dysfunction      Past Surgical History:   Procedure Laterality Date    APPENDECTOMY      CHOLECYSTECTOMY      LAP,CHOLECYSTECTOMY      OH APPENDECTOMY       Current Outpatient Medications   Medication Sig Dispense Refill    testosterone cypionate (DEPOTESTOTERONE CYPIONATE) 200 MG/ML injection Inject 0.3 mL into the muscle every Monday AND Thursday 8 mL 3    traZODone (DESYREL) 100 MG tablet Take 1 tablet by mouth nightly as needed for Sleep 90 tablet 1    escitalopram (LEXAPRO) 10 MG tablet Take 1 tablet by mouth daily 90 tablet 1    Testosterone Enanthate 50 MG/0.5ML SOAJ Inject 0.6 mLs into the muscle Twice a Week 2 mL 5     No current facility-administered medications for this visit. Allergies   Allergen Reactions    Lisinopril Other (See Comments)     Large increase (40%) in Creatinine with GFR reduced by 35%.      Penicillin G Other (See Comments)     Told had allergie by parents      Social History     Socioeconomic History    Marital status:      Spouse name: Not on file    Number of children: Not on file    Years of education: Not on file    Highest education level: Not on file   Occupational History    Not on file   Tobacco Use    Smoking status: Never    Smokeless tobacco: Never   Substance and Sexual Activity    Alcohol use: Yes    Drug use: No    Sexual activity: Not on file     Comment: With wife   Other Topics Concern    Not on file   Social History Narrative    Not on file     Social Determinants of Health     Financial Resource Strain: Not on file   Food Insecurity: Not on file   Transportation Needs: Not on file   Physical Activity: Not on file   Stress: Not on file   Social Connections: Not on file   Intimate Partner Violence: Not on file   Housing Stability: Not on file     Family History   Problem Relation Age of Onset    No Known Problems Brother     Hypertension Father     No Known Problems Mother     Diabetes Father        Review of Systems  Constitutional:   Negative for fever. GI:  Negative for nausea. Genitourinary:  Negative for flank pain. Urinalysis  UA - Dipstick  Results for orders placed or performed in visit on 03/01/23   AMB POC URINALYSIS DIP STICK AUTO W/O MICRO   Result Value Ref Range    Color (UA POC)      Clarity (UA POC)      Glucose, Urine, POC Negative Negative    Bilirubin, Urine, POC Negative Negative    KETONES, Urine, POC Negative Negative    Specific Gravity, Urine, POC 1.020 1.001 - 1.035    Blood (UA POC) Trace-intact Negative    pH, Urine, POC 7.0 4.6 - 8.0    Protein, Urine, POC Trace Negative    Urobilinogen, POC 0.2 mg/dL     Nitrite, Urine, POC Negative Negative    Leukocyte Esterase, Urine, POC Negative Negative       There were no vitals taken for this visit.      GENERAL: NAD, ALERT, A&O x 3, GAIT NORMAL  LUNGS: easy work of breathing  ABDOMEN: soft, non tender  CLAUDIA: 1+ no nodule  NEUROLOGIC: cranial nerves 2-12 grossly intact           Assessment and Plan    ICD-10-CM    1. Low testosterone  R79.89 AMB POC URINALYSIS DIP STICK AUTO W/O MICRO     testosterone cypionate (DEPOTESTOTERONE CYPIONATE) 200 MG/ML injection     CBC     PSA, Diagnostic     Testosterone Total Only, Male      2. Erectile dysfunction, unspecified erectile dysfunction type  N52.9 AMB POC URINALYSIS DIP STICK AUTO W/O MICRO          Blood work was reviewed. His T level is acceptable. He will continue as same dosage. I will see him back in 1 year with cbc/t/psa for shira. I have spent 20 minutes today reviewing previous notes, test results and face to face with the patient as well as documenting.

## 2023-03-19 DIAGNOSIS — F33.1 MODERATE EPISODE OF RECURRENT MAJOR DEPRESSIVE DISORDER (HCC): ICD-10-CM

## 2023-03-20 DIAGNOSIS — F33.1 MODERATE EPISODE OF RECURRENT MAJOR DEPRESSIVE DISORDER (HCC): ICD-10-CM

## 2023-03-20 RX ORDER — ESCITALOPRAM OXALATE 10 MG/1
10 TABLET ORAL DAILY
Qty: 90 TABLET | Refills: 1 | Status: CANCELLED | OUTPATIENT
Start: 2023-03-20

## 2023-03-20 RX ORDER — ESCITALOPRAM OXALATE 10 MG/1
10 TABLET ORAL DAILY
Qty: 90 TABLET | Refills: 1 | OUTPATIENT
Start: 2023-03-20

## 2023-03-25 DIAGNOSIS — F51.04 PSYCHOPHYSIOLOGIC INSOMNIA: ICD-10-CM

## 2023-03-27 RX ORDER — TRAZODONE HYDROCHLORIDE 100 MG/1
TABLET ORAL
Qty: 90 TABLET | Refills: 1 | OUTPATIENT
Start: 2023-03-27

## 2023-04-18 ENCOUNTER — OFFICE VISIT (OUTPATIENT)
Dept: FAMILY MEDICINE CLINIC | Facility: CLINIC | Age: 48
End: 2023-04-18
Payer: COMMERCIAL

## 2023-04-18 VITALS
WEIGHT: 192 LBS | BODY MASS INDEX: 28.44 KG/M2 | SYSTOLIC BLOOD PRESSURE: 145 MMHG | DIASTOLIC BLOOD PRESSURE: 100 MMHG | HEIGHT: 69 IN

## 2023-04-18 DIAGNOSIS — Z00.00 ROUTINE GENERAL MEDICAL EXAMINATION AT A HEALTH CARE FACILITY: Primary | ICD-10-CM

## 2023-04-18 DIAGNOSIS — R00.2 PALPITATIONS: ICD-10-CM

## 2023-04-18 DIAGNOSIS — Z12.11 SCREEN FOR COLON CANCER: ICD-10-CM

## 2023-04-18 DIAGNOSIS — R03.0 ELEVATED BP WITHOUT DIAGNOSIS OF HYPERTENSION: ICD-10-CM

## 2023-04-18 LAB
BILIRUBIN, URINE, POC: NEGATIVE
BLOOD URINE, POC: NEGATIVE
GLUCOSE URINE, POC: NEGATIVE
KETONES, URINE, POC: NEGATIVE
LEUKOCYTE ESTERASE, URINE, POC: NEGATIVE
NITRITE, URINE, POC: NEGATIVE
PH, URINE, POC: 7 (ref 4.6–8)
PROTEIN,URINE, POC: NEGATIVE
SPECIFIC GRAVITY, URINE, POC: 1.02 (ref 1–1.03)
URINALYSIS CLARITY, POC: CLEAR
URINALYSIS COLOR, POC: YELLOW
UROBILINOGEN, POC: NORMAL

## 2023-04-18 PROCEDURE — 81003 URINALYSIS AUTO W/O SCOPE: CPT | Performed by: FAMILY MEDICINE

## 2023-04-18 PROCEDURE — 3077F SYST BP >= 140 MM HG: CPT | Performed by: FAMILY MEDICINE

## 2023-04-18 PROCEDURE — 99396 PREV VISIT EST AGE 40-64: CPT | Performed by: FAMILY MEDICINE

## 2023-04-18 PROCEDURE — 3080F DIAST BP >= 90 MM HG: CPT | Performed by: FAMILY MEDICINE

## 2023-04-18 PROCEDURE — 93000 ELECTROCARDIOGRAM COMPLETE: CPT | Performed by: FAMILY MEDICINE

## 2023-04-18 RX ORDER — AMLODIPINE BESYLATE 2.5 MG/1
2.5 TABLET ORAL DAILY
Qty: 90 TABLET | Refills: 1 | Status: SHIPPED | OUTPATIENT
Start: 2023-04-18

## 2023-04-18 SDOH — ECONOMIC STABILITY: FOOD INSECURITY: WITHIN THE PAST 12 MONTHS, THE FOOD YOU BOUGHT JUST DIDN'T LAST AND YOU DIDN'T HAVE MONEY TO GET MORE.: NEVER TRUE

## 2023-04-18 SDOH — ECONOMIC STABILITY: FOOD INSECURITY: WITHIN THE PAST 12 MONTHS, YOU WORRIED THAT YOUR FOOD WOULD RUN OUT BEFORE YOU GOT MONEY TO BUY MORE.: NEVER TRUE

## 2023-04-18 SDOH — ECONOMIC STABILITY: HOUSING INSECURITY
IN THE LAST 12 MONTHS, WAS THERE A TIME WHEN YOU DID NOT HAVE A STEADY PLACE TO SLEEP OR SLEPT IN A SHELTER (INCLUDING NOW)?: NO

## 2023-04-18 SDOH — ECONOMIC STABILITY: INCOME INSECURITY: HOW HARD IS IT FOR YOU TO PAY FOR THE VERY BASICS LIKE FOOD, HOUSING, MEDICAL CARE, AND HEATING?: NOT HARD AT ALL

## 2023-04-18 ASSESSMENT — ENCOUNTER SYMPTOMS
BLOOD IN STOOL: 0
SHORTNESS OF BREATH: 0
RHINORRHEA: 0
TROUBLE SWALLOWING: 0
ABDOMINAL PAIN: 0
COLOR CHANGE: 0
CHEST TIGHTNESS: 0

## 2023-04-18 ASSESSMENT — PATIENT HEALTH QUESTIONNAIRE - PHQ9
5. POOR APPETITE OR OVEREATING: 1
9. THOUGHTS THAT YOU WOULD BE BETTER OFF DEAD, OR OF HURTING YOURSELF: 0
8. MOVING OR SPEAKING SO SLOWLY THAT OTHER PEOPLE COULD HAVE NOTICED. OR THE OPPOSITE, BEING SO FIGETY OR RESTLESS THAT YOU HAVE BEEN MOVING AROUND A LOT MORE THAN USUAL: 0
SUM OF ALL RESPONSES TO PHQ QUESTIONS 1-9: 7
SUM OF ALL RESPONSES TO PHQ QUESTIONS 1-9: 7
3. TROUBLE FALLING OR STAYING ASLEEP: 1
SUM OF ALL RESPONSES TO PHQ9 QUESTIONS 1 & 2: 2
6. FEELING BAD ABOUT YOURSELF - OR THAT YOU ARE A FAILURE OR HAVE LET YOURSELF OR YOUR FAMILY DOWN: 1
7. TROUBLE CONCENTRATING ON THINGS, SUCH AS READING THE NEWSPAPER OR WATCHING TELEVISION: 1
1. LITTLE INTEREST OR PLEASURE IN DOING THINGS: 1
2. FEELING DOWN, DEPRESSED OR HOPELESS: 1
SUM OF ALL RESPONSES TO PHQ QUESTIONS 1-9: 7
4. FEELING TIRED OR HAVING LITTLE ENERGY: 1
SUM OF ALL RESPONSES TO PHQ QUESTIONS 1-9: 7

## 2023-04-18 NOTE — PROGRESS NOTES
[place]  _______________________________________  MD Ken Mccray, DO  Dominga Thompson, NP Rosaria Can, [place]    [address], [address] [place] [address]  Phone: [phone]  Fax: [phone]    [place] Flank (:  1975) is a 57 y.o. male,Established patient, here for evaluation of the following chief complaint(s): Annual Exam         ASSESSMENT/PLAN:    1. Routine general medical examination at a health care facility  Doing well aside from the palpitations. EKG again shows early repol, is it pissible he has an accessory pathway putting him into circus arrhythmia? I have no idea, sending to cards to answer this, appreciate their help. He will get his colo, screening labs as below. - AMB POC URINALYSIS DIP STICK AUTO W/O MICRO  - CBC with Auto Differential; Future  - Comprehensive Metabolic Panel; Future  - TSH; Future  - Lipid Panel; Future    2. Palpitations    - EKG 12 Lead  - CBC with Auto Differential; Future  - Comprehensive Metabolic Panel; Future  - TSH; Future  - Magnesium; Future  - Tracey Hood MD, Cardiology, [place]    3. Screen for colon cancer    -  [facility] - Colonoscopy    4. Elevated BP without diagnosis of hypertension  Will try him on low dose CCB to see if this makes a difference between here and his cards appointment. - amLODIPine (NORVASC) 2.5 MG tablet; Take 1 tablet by mouth daily  Dispense: 90 tablet; Refill: 1  - Tracey Hood MD, Cardiology, Trista Iraheta already scheduled    Subjective   SUBJECTIVE/OBJECTIVE:  Here for checkup, no new issues other than a couple episodes of tachycardia and diaphoresis during intercourse. Was not taking ED meds prior to episodes. HTN: Not usually, but he is pretty wound up and nervous today. EKG unchanged from 2018 -- early repolarization.  Is having intermittent HA and blurred vision and occipital HA

## 2023-05-16 ENCOUNTER — PREP FOR PROCEDURE (OUTPATIENT)
Dept: SURGERY | Age: 48
End: 2023-05-16

## 2023-05-16 ENCOUNTER — OFFICE VISIT (OUTPATIENT)
Dept: SURGERY | Age: 48
End: 2023-05-16

## 2023-05-16 VITALS
HEIGHT: 69 IN | SYSTOLIC BLOOD PRESSURE: 145 MMHG | DIASTOLIC BLOOD PRESSURE: 90 MMHG | WEIGHT: 189 LBS | HEART RATE: 88 BPM | BODY MASS INDEX: 27.99 KG/M2

## 2023-05-16 DIAGNOSIS — Z12.11 ENCOUNTER FOR SCREENING COLONOSCOPY: Primary | ICD-10-CM

## 2023-05-18 PROBLEM — Z12.11 SCREEN FOR COLON CANCER: Status: RESOLVED | Noted: 2023-04-18 | Resolved: 2023-05-18

## 2023-05-23 ENCOUNTER — INITIAL CONSULT (OUTPATIENT)
Age: 48
End: 2023-05-23
Payer: COMMERCIAL

## 2023-05-23 VITALS
SYSTOLIC BLOOD PRESSURE: 134 MMHG | BODY MASS INDEX: 28.29 KG/M2 | HEIGHT: 69 IN | DIASTOLIC BLOOD PRESSURE: 88 MMHG | WEIGHT: 191 LBS | HEART RATE: 86 BPM

## 2023-05-23 DIAGNOSIS — R00.0 EXERCISE-INDUCED TACHYCARDIA: Primary | ICD-10-CM

## 2023-05-23 DIAGNOSIS — Z01.818 PRE-OP EVALUATION: ICD-10-CM

## 2023-05-23 PROCEDURE — 99203 OFFICE O/P NEW LOW 30 MIN: CPT | Performed by: INTERNAL MEDICINE

## 2023-05-23 PROCEDURE — 3075F SYST BP GE 130 - 139MM HG: CPT | Performed by: INTERNAL MEDICINE

## 2023-05-23 PROCEDURE — 3079F DIAST BP 80-89 MM HG: CPT | Performed by: INTERNAL MEDICINE

## 2023-05-23 NOTE — PROGRESS NOTES
Cardiovascular:      Rate and Rhythm: Normal rate. Heart sounds: Normal heart sounds. Pulmonary:      Effort: Pulmonary effort is normal.      Breath sounds: Normal breath sounds. Abdominal:      General: Abdomen is flat. Palpations: Abdomen is soft. There is no mass. Musculoskeletal:         General: Normal range of motion. Cervical back: Normal range of motion. Skin:     General: Skin is warm and dry. Neurological:      General: No focal deficit present. Mental Status: He is alert and oriented to person, place, and time. Psychiatric:         Mood and Affect: Mood normal.         RECENT LABS AND RECORDS REVIEW    Ekg nml      ASSESSMENT and PLAN    Yareli Bailon was seen today for consultation, hypertension and tachycardia. Diagnoses and all orders for this visit:    Exercise-induced tachycardia  -     Stress echocardiogram (TTE) exercise with contrast, bubble, strain, and 3D PRN order panel; Future    Pre-op evaluation    He is cleared for colonoscopy without waiting on stress result     Return for will schedule after procedure.        Tesfaye Mcfadden MD  5/23/2023  3:19 PM

## 2023-05-24 ENCOUNTER — TELEPHONE (OUTPATIENT)
Age: 48
End: 2023-05-24

## 2023-05-24 ENCOUNTER — TELEPHONE (OUTPATIENT)
Dept: SURGERY | Age: 48
End: 2023-05-24

## 2023-05-24 DIAGNOSIS — Z01.818 PRE-OP EVALUATION: ICD-10-CM

## 2023-05-24 DIAGNOSIS — R00.0 EXERCISE-INDUCED TACHYCARDIA: Primary | ICD-10-CM

## 2023-05-24 NOTE — TELEPHONE ENCOUNTER
Stress echo changed to ancillary performed because pts surgeon wants this test done before his procedure.

## 2023-05-24 NOTE — TELEPHONE ENCOUNTER
Juan/Amadeo:    [place] scheduled for: 5/31/23      *Has patient picked up medications Miralax, Dulcolax, Gatorade or drink of choice-not red)? P/U tomorrow      *Discussed instructions for how to take these and instructions for the week prior to your procedure? Yes       *Discussed instructions for the next couple of days? Yes       *Patient reminded of location of procedure and time of arrival?     Yes       *Who will be bringing patient and staying at the hospital with them during your procedure? yes      *Informed patient that they should receive a call from the hospital as well to pre-register them for this procedure? Yes       *Informed them of contact info if needed to cancel or reschedule this procedure?      Yes

## 2023-05-26 PROBLEM — Z12.11 ENCOUNTER FOR SCREENING COLONOSCOPY: Status: ACTIVE | Noted: 2023-05-17

## 2023-06-01 ENCOUNTER — TELEPHONE (OUTPATIENT)
Dept: UROLOGY | Age: 48
End: 2023-06-01

## 2023-06-01 ENCOUNTER — OFFICE VISIT (OUTPATIENT)
Dept: UROLOGY | Age: 48
End: 2023-06-01
Payer: COMMERCIAL

## 2023-06-01 DIAGNOSIS — F40.298 NEEDLE PHOBIA: ICD-10-CM

## 2023-06-01 DIAGNOSIS — N52.9 ERECTILE DYSFUNCTION, UNSPECIFIED ERECTILE DYSFUNCTION TYPE: ICD-10-CM

## 2023-06-01 DIAGNOSIS — R79.89 LOW TESTOSTERONE: Primary | ICD-10-CM

## 2023-06-01 DIAGNOSIS — E29.1 HYPOGONADISM MALE: ICD-10-CM

## 2023-06-01 PROCEDURE — 99214 OFFICE O/P EST MOD 30 MIN: CPT | Performed by: NURSE PRACTITIONER

## 2023-06-01 RX ORDER — TADALAFIL 20 MG/1
20 TABLET ORAL DAILY PRN
Qty: 30 TABLET | Refills: 1 | Status: SHIPPED | OUTPATIENT
Start: 2023-06-01 | End: 2023-06-01

## 2023-06-01 RX ORDER — TADALAFIL 20 MG/1
20 TABLET ORAL DAILY PRN
Qty: 30 TABLET | Refills: 5 | Status: SHIPPED | OUTPATIENT
Start: 2023-06-01

## 2023-06-01 ASSESSMENT — ENCOUNTER SYMPTOMS
BACK PAIN: 0
NAUSEA: 0

## 2023-06-01 NOTE — PROGRESS NOTES
[facility] UROLOGY  [address] [facility] [address], [address]  [phone]          Epifania Homans  : 1975    Chief Complaint   Patient presents with    Follow-up    Hypogonadism          HPI     Epifania Homans is a 57 y.o. male  Here today to discuss Testopel placement. Patient has been under the care of Dr. Lyudmila Hernández. He was initially referred by Dr. Mariel Verma due to low T and also ED. He was previously using 4 pumps of AndroGel daily, his T only increased to 320. His energy continues to be low. His libido also continue the low. He was seen by Dr. Leopold Navy who switched him over to UT replacement of 120 mg twice weekly injections. He was also using 50 mg sildenafil and tadalafil Sherrie for a day. He still was not having much response with energy or libido. The medication did not help his libido either. When seen by Dr. Lyudmila Hernández he was switched over to 200 mg of testosterone cypionate he would use 0.3 mL which is 60 mg twice a week. Monday/Thursday. His levels have been therapeutic but the injections have been painful. He is tired of using injection therapy. Levels without testosterone replacement 2020 total T was 163, 2020 testosterone level was 197, May 2020 using AndroGel his levels were only 320.       Past Medical History:   Diagnosis Date    Adverse effect of anesthesia     pt reported in past anethesia stated \"it takes a lot to put me under\"    Anxiety NA    Depression 2020    Erectile dysfunction     Headache 3/1/2023    Hypertension     borderline    Palpitations 2023     Past Surgical History:   Procedure Laterality Date    APPENDECTOMY      CHOLECYSTECTOMY       Current Outpatient Medications   Medication Sig Dispense Refill    tadalafil (CIALIS) 20 MG tablet Take 1 tablet by mouth daily as needed for Erectile Dysfunction 30 tablet 5    amLODIPine (NORVASC) 2.5 MG tablet Take 1 tablet by mouth daily 90 tablet 1    testosterone cypionate

## 2023-06-20 ENCOUNTER — TELEPHONE (OUTPATIENT)
Dept: UROLOGY | Age: 48
End: 2023-06-20

## 2023-06-20 NOTE — TELEPHONE ENCOUNTER
Patient showed up for office visit today. He did not receive a phone call from office pertaining to no PA done. We explained to him that the PA is now pending we hopefully will hear something soon. He does not want to have Testopel today due to cash payment being over $1500.

## 2023-06-20 NOTE — TELEPHONE ENCOUNTER
Called patient advised Testopel auth pending, advised oop cost $1682.00, advised patient to call if he would like to reschedule

## 2023-06-25 PROBLEM — Z12.11 ENCOUNTER FOR SCREENING COLONOSCOPY: Status: RESOLVED | Noted: 2023-05-17 | Resolved: 2023-06-25

## 2023-06-27 DIAGNOSIS — F33.1 MODERATE EPISODE OF RECURRENT MAJOR DEPRESSIVE DISORDER (HCC): ICD-10-CM

## 2023-06-27 RX ORDER — ESCITALOPRAM OXALATE 10 MG/1
10 TABLET ORAL DAILY
Qty: 90 TABLET | Refills: 1 | OUTPATIENT
Start: 2023-06-27

## 2023-06-29 PROBLEM — Z12.11 ENCOUNTER FOR SCREENING COLONOSCOPY: Status: ACTIVE | Noted: 2023-05-17

## 2023-07-12 RX ORDER — OMEPRAZOLE 20 MG/1
20 TABLET, DELAYED RELEASE ORAL PRN
COMMUNITY

## 2023-07-12 NOTE — PERIOP NOTE
Patient name, , and procedure verified. Type: 1a; abbreviated assessment per anesthesia guidelines    Labs per anesthesia: none    Cardiology notes stated that patient could proceed with colonoscopy prior to stress test, however it ws completed 23. Results not yet available. Previous notes states anesthesiologist wants to review prior stress prior to colonoscopy. Chart marked for charge nurse to send for review once results are available. Chart placed in [facility] office. Instructed will receive notificattion the day before procedure by the GI Lab of time of arrival for [facility] cases, and by Pre-op Department for [address] cases. Arrival times should be called by 5 pm. If no phone is received the patient should contact their respective hospital. The GI lab telephone number is [phone] and ES Pre-op is [phone]. Follow diet and prep instructions per office including NPO status. If patient has NOT received instructions from office patient is advised to call surgeon office, verbalizes understanding. Bath or shower the night before and the am of surgery with non-mositurizing soap. No lotions, oils, powders, cologne on skin. No make up, eye make up or jewelry. Wear loose fitting comfortable, clean clothing. Must have adult present in building the entire time . TAKE ONLY THE FOLLOWING MEDICATION ON THE DAY OF THE PROCEDURE : omeprazole, lexapro    MEDICATIONS TO HOLD : vitamins and supplements 7 days prior to procedure    The following discharge instructions reviewed : medication given during procedure may cause drowsiness for several hours, therefore, patient must not drive or operate machinery for remainder of the day. Patient may not drink alcohol on the day of your procedure, and should resume regular diet and activity unless otherwise directed. You may experience abdominal distention for several hours that is relieved by the passage of gas.  Contact your physician if you have any

## 2023-07-14 ENCOUNTER — TELEPHONE (OUTPATIENT)
Dept: SURGERY | Age: 48
End: 2023-07-14

## 2023-07-15 NOTE — H&P
Name: Mathis Blizzard      MRN: [id number]       : 1975       Age: 57 y.o. Sex: male        Areli Santacruz MD       CC:  No chief complaint on file. HPI:  The patient is referred here for a colonoscopy by Dr. Topher Begum. Had a colonoscopy in  which was negative. No recent abdominal pain, nausea or vomiting. Family hx of colon cancer No      Previous colonoscopy Yes   BRBPR No   Melena No   Loss of appetite No   Weight loss No      Change in bowel habits No       HISTORY:    Past Medical History:   Diagnosis Date    Adverse effect of anesthesia     pt reported in past anethesia stated \"it takes a lot to put me under\" and then it takes longer to wake up too    Anxiety NA    Depression 2020    Erectile dysfunction     Headache 3/1/2023    Hypertension     borderline    Insomnia     Palpitations 2023    Prolonged emergence from general anesthesia      Past Surgical History:   Procedure Laterality Date    [place]      COLONOSCOPY       Prior to Admission medications    Medication Sig Start Date End Date Taking? Authorizing Provider   omeprazole (PRILOSEC OTC) 20 MG tablet Take 1 tablet by mouth as needed   Yes Historical Provider, MD   tadalafil (CIALIS) 20 MG tablet Take 1 tablet by mouth daily as needed for Erectile Dysfunction 23   USHA Metz - NP   testosterone cypionate (DEPOTESTOTERONE CYPIONATE) 200 MG/ML injection Inject 0.3 mL into the muscle every Monday AND Thursday 3/1/23 3/1/24  Andreea Pichardo MD   traZODone (DESYREL) 100 MG tablet Take 1 tablet by mouth nightly as needed for Sleep 22   Areli Santacruz MD   escitalopram (LEXAPRO) 10 MG tablet Take 1 tablet by mouth daily 22   Areli Santacruz MD     Social History     Tobacco Use    Smoking status: Never    Smokeless tobacco: Never   Vaping Use    Vaping Use: Never used   Substance Use Topics    Alcohol use:  Yes     Alcohol/week: 1.0 standard drink     Types: 1 Drinks

## 2023-07-17 ENCOUNTER — TELEPHONE (OUTPATIENT)
Dept: SURGERY | Age: 48
End: 2023-07-17

## 2023-07-17 NOTE — TELEPHONE ENCOUNTER
[facility]:    [place] scheduled for: 7/21/23      *Has patient picked up medications Miralax, Dulcolax, Gatorade or drink of choice-not red)? No      *Discussed instructions for how to take these and instructions for the week prior to your procedure? Yes       *Discussed instructions for the next couple of days? Yes       *Patient reminded of location of procedure and time of arrival?     Yes       *Who will be bringing patient and staying at the hospital with them during your procedure? yes      *Informed patient that they should receive a call from the hospital as well to pre-register them for this procedure? Yes       *Informed them of contact info if needed to cancel or reschedule this procedure?      Yes

## 2023-07-18 DIAGNOSIS — F51.04 PSYCHOPHYSIOLOGIC INSOMNIA: ICD-10-CM

## 2023-07-18 RX ORDER — TRAZODONE HYDROCHLORIDE 100 MG/1
100 TABLET ORAL NIGHTLY PRN
Qty: 90 TABLET | Refills: 1 | Status: SHIPPED | OUTPATIENT
Start: 2023-07-18

## 2023-07-20 ENCOUNTER — ANESTHESIA EVENT (OUTPATIENT)
Dept: ENDOSCOPY | Age: 48
End: 2023-07-20
Payer: COMMERCIAL

## 2023-07-20 RX ORDER — SODIUM CHLORIDE 9 MG/ML
INJECTION, SOLUTION INTRAVENOUS PRN
Status: CANCELLED | OUTPATIENT
Start: 2023-07-20

## 2023-07-20 RX ORDER — SODIUM CHLORIDE 0.9 % (FLUSH) 0.9 %
5-40 SYRINGE (ML) INJECTION EVERY 12 HOURS SCHEDULED
Status: CANCELLED | OUTPATIENT
Start: 2023-07-20

## 2023-07-20 RX ORDER — SODIUM CHLORIDE, SODIUM LACTATE, POTASSIUM CHLORIDE, CALCIUM CHLORIDE 600; 310; 30; 20 MG/100ML; MG/100ML; MG/100ML; MG/100ML
INJECTION, SOLUTION INTRAVENOUS CONTINUOUS
Status: CANCELLED | OUTPATIENT
Start: 2023-07-20

## 2023-07-20 RX ORDER — SODIUM CHLORIDE 0.9 % (FLUSH) 0.9 %
5-40 SYRINGE (ML) INJECTION PRN
Status: CANCELLED | OUTPATIENT
Start: 2023-07-20

## 2023-07-20 RX ORDER — DEXTROSE MONOHYDRATE 100 MG/ML
INJECTION, SOLUTION INTRAVENOUS CONTINUOUS PRN
Status: CANCELLED | OUTPATIENT
Start: 2023-07-20

## 2023-07-21 ENCOUNTER — ANESTHESIA (OUTPATIENT)
Dept: ENDOSCOPY | Age: 48
End: 2023-07-21
Payer: COMMERCIAL

## 2023-07-21 ENCOUNTER — HOSPITAL ENCOUNTER (OUTPATIENT)
Age: 48
Setting detail: OUTPATIENT SURGERY
Discharge: HOME | End: 2023-07-21
Attending: SURGERY | Admitting: SURGERY
Payer: COMMERCIAL

## 2023-07-21 VITALS
TEMPERATURE: 98 F | OXYGEN SATURATION: 95 % | SYSTOLIC BLOOD PRESSURE: 121 MMHG | DIASTOLIC BLOOD PRESSURE: 73 MMHG | HEIGHT: 69 IN | BODY MASS INDEX: 27.85 KG/M2 | WEIGHT: 188 LBS | RESPIRATION RATE: 16 BRPM | HEART RATE: 65 BPM

## 2023-07-21 DIAGNOSIS — Z12.11 ENCOUNTER FOR SCREENING COLONOSCOPY: ICD-10-CM

## 2023-07-21 PROCEDURE — 3700000001 HC ADD 15 MINUTES (ANESTHESIA): Performed by: SURGERY

## 2023-07-21 PROCEDURE — 2580000003 HC RX 258: Performed by: ANESTHESIOLOGY

## 2023-07-21 PROCEDURE — 6360000002 HC RX W HCPCS: Performed by: NURSE ANESTHETIST, CERTIFIED REGISTERED

## 2023-07-21 PROCEDURE — 45378 DIAGNOSTIC COLONOSCOPY: CPT | Performed by: SURGERY

## 2023-07-21 PROCEDURE — 7100000011 HC PHASE II RECOVERY - ADDTL 15 MIN: Performed by: SURGERY

## 2023-07-21 PROCEDURE — 7100000010 HC PHASE II RECOVERY - FIRST 15 MIN: Performed by: SURGERY

## 2023-07-21 PROCEDURE — 3609027000 HC COLONOSCOPY: Performed by: SURGERY

## 2023-07-21 PROCEDURE — 2709999900 HC NON-CHARGEABLE SUPPLY: Performed by: SURGERY

## 2023-07-21 PROCEDURE — 3700000000 HC ANESTHESIA ATTENDED CARE: Performed by: SURGERY

## 2023-07-21 RX ORDER — MIDAZOLAM HYDROCHLORIDE 1 MG/ML
INJECTION INTRAMUSCULAR; INTRAVENOUS
Status: COMPLETED
Start: 2023-07-21 | End: 2023-07-21

## 2023-07-21 RX ORDER — MIDAZOLAM HYDROCHLORIDE 1 MG/ML
INJECTION INTRAMUSCULAR; INTRAVENOUS PRN
Status: DISCONTINUED | OUTPATIENT
Start: 2023-07-21 | End: 2023-07-21 | Stop reason: SDUPTHER

## 2023-07-21 RX ORDER — SODIUM CHLORIDE, SODIUM LACTATE, POTASSIUM CHLORIDE, CALCIUM CHLORIDE 600; 310; 30; 20 MG/100ML; MG/100ML; MG/100ML; MG/100ML
INJECTION, SOLUTION INTRAVENOUS CONTINUOUS
Status: DISCONTINUED | OUTPATIENT
Start: 2023-07-21 | End: 2023-07-21 | Stop reason: HOSPADM

## 2023-07-21 RX ORDER — SODIUM CHLORIDE 9 MG/ML
INJECTION, SOLUTION INTRAVENOUS PRN
Status: DISCONTINUED | OUTPATIENT
Start: 2023-07-21 | End: 2023-07-21 | Stop reason: HOSPADM

## 2023-07-21 RX ORDER — SODIUM CHLORIDE 0.9 % (FLUSH) 0.9 %
5-40 SYRINGE (ML) INJECTION PRN
Status: DISCONTINUED | OUTPATIENT
Start: 2023-07-21 | End: 2023-07-21 | Stop reason: HOSPADM

## 2023-07-21 RX ORDER — SODIUM CHLORIDE 0.9 % (FLUSH) 0.9 %
5-40 SYRINGE (ML) INJECTION EVERY 12 HOURS SCHEDULED
Status: DISCONTINUED | OUTPATIENT
Start: 2023-07-21 | End: 2023-07-21 | Stop reason: HOSPADM

## 2023-07-21 RX ORDER — PROPOFOL 10 MG/ML
INJECTION, EMULSION INTRAVENOUS PRN
Status: DISCONTINUED | OUTPATIENT
Start: 2023-07-21 | End: 2023-07-21 | Stop reason: SDUPTHER

## 2023-07-21 RX ORDER — LIDOCAINE HYDROCHLORIDE 10 MG/ML
1 INJECTION, SOLUTION INFILTRATION; PERINEURAL
Status: DISCONTINUED | OUTPATIENT
Start: 2023-07-21 | End: 2023-07-21 | Stop reason: HOSPADM

## 2023-07-21 RX ORDER — PROPOFOL 10 MG/ML
INJECTION, EMULSION INTRAVENOUS CONTINUOUS PRN
Status: DISCONTINUED | OUTPATIENT
Start: 2023-07-21 | End: 2023-07-21 | Stop reason: SDUPTHER

## 2023-07-21 RX ADMIN — PROPOFOL 50 MG: 10 INJECTION, EMULSION INTRAVENOUS at 11:09

## 2023-07-21 RX ADMIN — PROPOFOL 50 MG: 10 INJECTION, EMULSION INTRAVENOUS at 11:17

## 2023-07-21 RX ADMIN — PROPOFOL 120 MCG/KG/MIN: 10 INJECTION, EMULSION INTRAVENOUS at 11:10

## 2023-07-21 RX ADMIN — PROPOFOL 50 MG: 10 INJECTION, EMULSION INTRAVENOUS at 11:10

## 2023-07-21 RX ADMIN — MIDAZOLAM 2 MG: 1 INJECTION INTRAMUSCULAR; INTRAVENOUS at 11:06

## 2023-07-21 RX ADMIN — PROPOFOL 50 MG: 10 INJECTION, EMULSION INTRAVENOUS at 11:28

## 2023-07-21 RX ADMIN — PROPOFOL 50 MG: 10 INJECTION, EMULSION INTRAVENOUS at 11:23

## 2023-07-21 RX ADMIN — PROPOFOL 25 MG: 10 INJECTION, EMULSION INTRAVENOUS at 11:12

## 2023-07-21 RX ADMIN — PROPOFOL 50 MG: 10 INJECTION, EMULSION INTRAVENOUS at 11:21

## 2023-07-21 RX ADMIN — SODIUM CHLORIDE, POTASSIUM CHLORIDE, SODIUM LACTATE AND CALCIUM CHLORIDE: 600; 310; 30; 20 INJECTION, SOLUTION INTRAVENOUS at 10:48

## 2023-07-21 ASSESSMENT — PAIN - FUNCTIONAL ASSESSMENT: PAIN_FUNCTIONAL_ASSESSMENT: 0-10

## 2023-07-21 NOTE — INTERVAL H&P NOTE
Update History & Physical    The patient's History and Physical of 7/15/23 was reviewed with the patient and I examined the patient. There was no change. The surgical site was confirmed by the patient and me. Plan: The risks, benefits, expected outcome, and alternative to the recommended procedure have been discussed with the patient. Patient understands and wants to proceed with the procedure.      Electronically signed by Mariah Perdue MD on 7/21/2023 at 10:55 AM

## 2023-07-21 NOTE — OP NOTE
[address] [facility]  OPERATIVE REPORT    Name:  Robinson Marroquin  MR#:  [id number]  :  1975  ACCOUNT #:  [de-identified]  DATE OF SERVICE:  2023    PREOPERATIVE DIAGNOSIS:  Request for screening colonoscopy. He had one in 2013 that was negative. POSTOPERATIVE DIAGNOSES:  1. Good bowel prep. 2.  No masses, polyps, or mucosal abnormalities noted. PROCEDURE PERFORMED:  Colonoscopy. SURGEON:  Esperanza Velez MD    ASSISTANT:  None. ANESTHESIA:  Monitored anesthesia care with Dr. Marbin Marc and Arron Campoverde, the nurse anesthetist.    COMPLICATIONS:  None. SPECIMENS REMOVED:  None. IMPLANTS:  None. ESTIMATED BLOOD LOSS:  None. DRAINS:  None. HISTORY:  A 82-year-old male who I was asked see by Dr. Jose Izaguirre for a colonoscopy. He was seen in the office. I scheduled the procedure giving him instructions to do a bowel prep the day before. I went through risks of bleeding, infection, anesthesia, and perforation of colon. He was agreeable, signed the consent form. PROCEDURE:  The patient was seen in the preop area, then transported to room #4 [address] [place] [address], he was placed on a stretcher in left lateral decubitus position. Oxygen via nasal cannula was administered. The O2 sats and vital signs were monitored throughout the procedure. Monitored anesthesia care was done by Dr. Marbin Marc and Arron Campoverde, the nurse anesthetist.  Once the sedative effects taken hold, an adult colonoscope was advanced through the anus and all the way to the cecum. Cecum was identified with the ileocecal valve, cecal folds. External compression of right lower quadrant corresponded to an indentation seen with the scope. Scope was slowly withdrawn over a 6-minute period of time. There were no lesions noted in the cecum, ascending colon, transverse colon, descending or sigmoid colon. Scope was brought back to the rectum. It was retroflexed. No masses or polyps noted.   Scope

## 2023-07-28 ASSESSMENT — PATIENT HEALTH QUESTIONNAIRE - PHQ9
7. TROUBLE CONCENTRATING ON THINGS, SUCH AS READING THE NEWSPAPER OR WATCHING TELEVISION: SEVERAL DAYS
SUM OF ALL RESPONSES TO PHQ QUESTIONS 1-9: 12
SUM OF ALL RESPONSES TO PHQ QUESTIONS 1-9: 12
7. TROUBLE CONCENTRATING ON THINGS, SUCH AS READING THE NEWSPAPER OR WATCHING TELEVISION: 1
6. FEELING BAD ABOUT YOURSELF - OR THAT YOU ARE A FAILURE OR HAVE LET YOURSELF OR YOUR FAMILY DOWN: SEVERAL DAYS
SUM OF ALL RESPONSES TO PHQ9 QUESTIONS 1 & 2: 4
2. FEELING DOWN, DEPRESSED OR HOPELESS: MORE THAN HALF THE DAYS
4. FEELING TIRED OR HAVING LITTLE ENERGY: NEARLY EVERY DAY
SUM OF ALL RESPONSES TO PHQ QUESTIONS 1-9: 12
1. LITTLE INTEREST OR PLEASURE IN DOING THINGS: MORE THAN HALF THE DAYS
6. FEELING BAD ABOUT YOURSELF - OR THAT YOU ARE A FAILURE OR HAVE LET YOURSELF OR YOUR FAMILY DOWN: 1
10. IF YOU CHECKED OFF ANY PROBLEMS, HOW DIFFICULT HAVE THESE PROBLEMS MADE IT FOR YOU TO DO YOUR WORK, TAKE CARE OF THINGS AT HOME, OR GET ALONG WITH OTHER PEOPLE: 1
SUM OF ALL RESPONSES TO PHQ QUESTIONS 1-9: 12
9. THOUGHTS THAT YOU WOULD BE BETTER OFF DEAD, OR OF HURTING YOURSELF: 0
8. MOVING OR SPEAKING SO SLOWLY THAT OTHER PEOPLE COULD HAVE NOTICED. OR THE OPPOSITE, BEING SO FIGETY OR RESTLESS THAT YOU HAVE BEEN MOVING AROUND A LOT MORE THAN USUAL: 0
9. THOUGHTS THAT YOU WOULD BE BETTER OFF DEAD, OR OF HURTING YOURSELF: NOT AT ALL
1. LITTLE INTEREST OR PLEASURE IN DOING THINGS: 2
10. IF YOU CHECKED OFF ANY PROBLEMS, HOW DIFFICULT HAVE THESE PROBLEMS MADE IT FOR YOU TO DO YOUR WORK, TAKE CARE OF THINGS AT HOME, OR GET ALONG WITH OTHER PEOPLE: SOMEWHAT DIFFICULT
3. TROUBLE FALLING OR STAYING ASLEEP: SEVERAL DAYS
2. FEELING DOWN, DEPRESSED OR HOPELESS: 2
5. POOR APPETITE OR OVEREATING: 2
3. TROUBLE FALLING OR STAYING ASLEEP: 1
SUM OF ALL RESPONSES TO PHQ QUESTIONS 1-9: 12
5. POOR APPETITE OR OVEREATING: MORE THAN HALF THE DAYS
8. MOVING OR SPEAKING SO SLOWLY THAT OTHER PEOPLE COULD HAVE NOTICED. OR THE OPPOSITE - BEING SO FIDGETY OR RESTLESS THAT YOU HAVE BEEN MOVING AROUND A LOT MORE THAN USUAL: NOT AT ALL
4. FEELING TIRED OR HAVING LITTLE ENERGY: 3

## 2023-07-29 PROBLEM — Z12.11 ENCOUNTER FOR SCREENING COLONOSCOPY: Status: RESOLVED | Noted: 2023-05-17 | Resolved: 2023-07-29

## 2023-07-31 ENCOUNTER — OFFICE VISIT (OUTPATIENT)
Dept: FAMILY MEDICINE CLINIC | Facility: CLINIC | Age: 48
End: 2023-07-31
Payer: COMMERCIAL

## 2023-07-31 VITALS
HEIGHT: 69 IN | SYSTOLIC BLOOD PRESSURE: 124 MMHG | BODY MASS INDEX: 28.44 KG/M2 | WEIGHT: 192 LBS | DIASTOLIC BLOOD PRESSURE: 85 MMHG

## 2023-07-31 DIAGNOSIS — F33.1 MODERATE EPISODE OF RECURRENT MAJOR DEPRESSIVE DISORDER (HCC): ICD-10-CM

## 2023-07-31 DIAGNOSIS — F51.04 PSYCHOPHYSIOLOGIC INSOMNIA: ICD-10-CM

## 2023-07-31 DIAGNOSIS — K21.9 GASTROESOPHAGEAL REFLUX DISEASE WITHOUT ESOPHAGITIS: ICD-10-CM

## 2023-07-31 DIAGNOSIS — I10 BENIGN ESSENTIAL HTN: Primary | ICD-10-CM

## 2023-07-31 PROCEDURE — 99214 OFFICE O/P EST MOD 30 MIN: CPT | Performed by: FAMILY MEDICINE

## 2023-07-31 PROCEDURE — 3079F DIAST BP 80-89 MM HG: CPT | Performed by: FAMILY MEDICINE

## 2023-07-31 PROCEDURE — 3074F SYST BP LT 130 MM HG: CPT | Performed by: FAMILY MEDICINE

## 2023-07-31 RX ORDER — FLUOXETINE HYDROCHLORIDE 20 MG/1
20 CAPSULE ORAL DAILY
Qty: 90 CAPSULE | Refills: 1 | Status: SHIPPED | OUTPATIENT
Start: 2023-07-31

## 2023-07-31 RX ORDER — ESCITALOPRAM OXALATE 10 MG/1
10 TABLET ORAL DAILY
Qty: 90 TABLET | Refills: 1 | Status: CANCELLED | OUTPATIENT
Start: 2023-07-31

## 2023-07-31 ASSESSMENT — ENCOUNTER SYMPTOMS
SHORTNESS OF BREATH: 0
CHEST TIGHTNESS: 0
BLOOD IN STOOL: 0

## 2023-07-31 NOTE — PROGRESS NOTES
[address] [facility]  _______________________________________  MD Rachel Booth, DO Darryle Girt, NP Marge Monte, MD Denese Kitten, [address] [facility], [address]  Phone: [phone]  Fax: [phone]    Dino Weaver (:  1975) is a 57 y.o. male,Established patient, here for evaluation of the following chief complaint(s):  Depression (Would like to referral to therapist ), Hypertension, Insomnia, and Gastroesophageal Reflux         ASSESSMENT/PLAN:    1. Moderate episode of recurrent major depressive disorder ([address])  Will try switching him to prozac as depressive symptoms seem to be overriding at this point in spite of some panic. Also sending to psych because I have not been able to get him right, appreciate their help. - FLUoxetine (PROZAC) 20 MG capsule; Take 1 capsule by mouth daily  Dispense: 90 capsule; Refill: 1  - AFL - [employer] [facility] ([address])    2. Benign essential HTN  Stable, will monitor. 3. Gastroesophageal reflux disease without esophagitis  Stable, continue current regimen. 4. Psychophysiologic insomnia  As above, continue trazodone, switch SSRI to prozac, referred for counseling and psych FU.   - FLUoxetine (PROZAC) 20 MG capsule; Take 1 capsule by mouth daily  Dispense: 90 capsule; Refill: 1  - [facility] ([address])        FU 6m    Subjective   SUBJECTIVE/OBJECTIVE:      Pt with continuing depression struggles in the setting of HTN and ED. We hat switched him off SSRI prozac to Wellbutrin because of sexual side effects. This did not improve bedroom function and his mood was worse on Wellbutrin. He ended up seeing urology and trialed on trimix for the ED. His wife is not really in to . States Wellbutrin made him overly emotional, having crying spells if just a sad song came on. His wife notes that he did best on Lexapro which he was on years ago.  Has

## 2023-08-01 ENCOUNTER — OFFICE VISIT (OUTPATIENT)
Dept: UROLOGY | Age: 48
End: 2023-08-01
Payer: COMMERCIAL

## 2023-08-01 DIAGNOSIS — E29.1 HYPOGONADISM MALE: Primary | ICD-10-CM

## 2023-08-01 PROCEDURE — S0189 TESTOSTERONE PELLET 75 MG: HCPCS | Performed by: NURSE PRACTITIONER

## 2023-08-01 PROCEDURE — 11980 IMPLANT HORMONE PELLET(S): CPT | Performed by: NURSE PRACTITIONER

## 2023-08-01 NOTE — PROGRESS NOTES
Here to start testopel therepy    Identified on right upper outer quadrant of the hip for insertion. Prepped area with Betadine and injected 7ccs of Lidocaine 2% with Epinephrine to anesthetize superficially and then distally along  trocar tract. Made 3mm incision using #11 blade scalpel. Trocar with a sharp ended stylet was inserted into subcutaneous tissue in the line with femur. Sharp stylet with withdrawn and 5 Testopel pellets were placed into trocar well. Testopel advanced in to tissue using the blunt ended stylet. This was repeated again. Total of 10 pellets were inserted. Trocar removed  and the incision closed using 3 steri-strip. Cleansed area to remove betaine and covered steri-strips with outer Tegaderm. Careful inspection of insertion area done, patient informed of post procedure instruction. Pt will return in 3 month to monitor T levels and again at 4 months to determine scheduling of next insertion. USHA Ambrose - LAUREN Van is supervising physician today and he approves plan of care. Return for 1M T- level,3M T level only, 4M Testopel. Elements of this note have been dictated using speech recognition software. Although reviewed, errors of speech recognition may have occurred.

## 2023-08-23 DIAGNOSIS — E29.1 HYPOGONADISM MALE: ICD-10-CM

## 2023-08-25 LAB
TESTOST FREE SERPL-MCNC: 25.6 PG/ML (ref 6.8–21.5)
TESTOST SERPL-MCNC: 677 NG/DL (ref 264–916)

## 2023-10-05 ENCOUNTER — OFFICE VISIT (OUTPATIENT)
Dept: FAMILY MEDICINE CLINIC | Facility: CLINIC | Age: 48
End: 2023-10-05
Payer: COMMERCIAL

## 2023-10-05 VITALS
WEIGHT: 195 LBS | HEIGHT: 69 IN | SYSTOLIC BLOOD PRESSURE: 118 MMHG | DIASTOLIC BLOOD PRESSURE: 80 MMHG | BODY MASS INDEX: 28.88 KG/M2

## 2023-10-05 DIAGNOSIS — E29.1 HYPOGONADISM MALE: ICD-10-CM

## 2023-10-05 DIAGNOSIS — R06.81 WITNESSED EPISODE OF APNEA: ICD-10-CM

## 2023-10-05 DIAGNOSIS — F33.1 MODERATE EPISODE OF RECURRENT MAJOR DEPRESSIVE DISORDER (HCC): Primary | ICD-10-CM

## 2023-10-05 DIAGNOSIS — G47.8 NON-RESTORATIVE SLEEP: ICD-10-CM

## 2023-10-05 DIAGNOSIS — F51.04 PSYCHOPHYSIOLOGIC INSOMNIA: ICD-10-CM

## 2023-10-05 PROCEDURE — 3079F DIAST BP 80-89 MM HG: CPT | Performed by: FAMILY MEDICINE

## 2023-10-05 PROCEDURE — 99214 OFFICE O/P EST MOD 30 MIN: CPT | Performed by: FAMILY MEDICINE

## 2023-10-05 PROCEDURE — 3074F SYST BP LT 130 MM HG: CPT | Performed by: FAMILY MEDICINE

## 2023-10-05 RX ORDER — FAMOTIDINE 10 MG
10 TABLET ORAL 2 TIMES DAILY
COMMUNITY

## 2023-10-05 ASSESSMENT — ENCOUNTER SYMPTOMS
CHEST TIGHTNESS: 0
BLOOD IN STOOL: 0
SHORTNESS OF BREATH: 0

## 2023-10-05 NOTE — PROGRESS NOTES
[facility]  _______________________________________  MD Rahat Donaldson, DO  k Alt, NP    MD Lizzy Vanegas MD    [address], [address]  Phone: [phone]  Fax: [phone]    Nimo Peck (:  1975) is a 57 y.o. male,Established patient, here for evaluation of the following chief complaint(s):  Fatigue         ASSESSMENT/PLAN:    1. Moderate episode of recurrent major depressive disorder (HCC)  Stable, continue current regimen. 2. Psychophysiologic insomnia  Asked him to start cutting the trazodone. 3. Non-restorative sleep  Highly suspect some kind of apnea with his symptoms and sweating at night and his wife supporting him holding his breath for long periods.    - [facility] - Camilo Cardoso MD, Sleep Medicine, [place]    4. Witnessed episode of apnea  As above, appreciate their help.   - [facility] Camilo Cardoso MD, Sleep Medicine, [place]    5. Hypogonadism male  Wants to go back on Androgel, will check T labs right around the time the pellets should be wearing off, Dec.   - Testosterone, free, total; Future  - CBC with Auto Differential; Future  - PSA, Diagnostic; Future        FU 6m    Subjective   SUBJECTIVE/OBJECTIVE:      MDD: Now on Prozac 20 and has been referred to therapy. Using Trazadone 100 to help sleep but he is feeling worn out and groggy. BP was controlled off meds after reconciling with his wife.  4    BP Readings from Last 3 Encounters:   10/05/23 118/80   23 124/85   23 121/73     Lab Results   Component Value Date/Time     2023 11:43 AM    K 4.1 2023 11:43 AM     2023 11:43 AM    CO2 30 2023 11:43 AM    BUN 10 2023 11:43 AM    CREATININE 1.20 2023 11:43 AM    GLUCOSE 87 2023 11:43 AM    CALCIUM 8.8 2023 11:43 AM    LABGLOM >60 2023 11:43 AM        Low T: Had pellets inserted subQ, would be due for

## 2023-10-14 DIAGNOSIS — R03.0 ELEVATED BP WITHOUT DIAGNOSIS OF HYPERTENSION: ICD-10-CM

## 2023-10-16 RX ORDER — AMLODIPINE BESYLATE 2.5 MG/1
2.5 TABLET ORAL DAILY
Qty: 90 TABLET | Refills: 1 | OUTPATIENT
Start: 2023-10-16

## 2023-10-24 ENCOUNTER — HOSPITAL ENCOUNTER (OUTPATIENT)
Dept: SLEEP CENTER | Age: 48
Discharge: HOME | End: 2023-10-27
Payer: COMMERCIAL

## 2023-10-24 PROCEDURE — 95806 SLEEP STUDY UNATT&RESP EFFT: CPT

## 2023-11-02 ENCOUNTER — TELEPHONE (OUTPATIENT)
Dept: UROLOGY | Age: 48
End: 2023-11-02

## 2023-11-02 ENCOUNTER — PATIENT MESSAGE (OUTPATIENT)
Dept: FAMILY MEDICINE CLINIC | Facility: CLINIC | Age: 48
End: 2023-11-02

## 2023-11-02 NOTE — TELEPHONE ENCOUNTER
Yes, he'd need a visit with one of us to make sure we're treating the right thing. Also let him know that sinus decongestants can cause rebound drippy nose if you use the decongestant more than a couple days in a row.

## 2023-11-02 NOTE — TELEPHONE ENCOUNTER
From: Nimo Peck  To: Dr. Madai Nelson: 11/2/2023 11:11 AM EDT  Subject: Sinus/Allergy issues    Good morning Dr. Jennifer Viramontes. I have been struggling with sinus/allergy issues for a month now. My nose running and gets worse at night. I have taken Allergy medicine for several weeks now and started taking Sinus medicine for a week now and nothing is making it better. So in short, I have a running nose, sneezing and a slight cough ever once in a while. None of the store bought allergy/sinus medicine is helping. Is there something you could prescribe for me that would be better? Thank you.

## 2023-11-03 ENCOUNTER — OFFICE VISIT (OUTPATIENT)
Dept: FAMILY MEDICINE CLINIC | Facility: CLINIC | Age: 48
End: 2023-11-03
Payer: COMMERCIAL

## 2023-11-03 VITALS
TEMPERATURE: 99 F | HEART RATE: 86 BPM | WEIGHT: 192 LBS | DIASTOLIC BLOOD PRESSURE: 81 MMHG | HEIGHT: 69 IN | RESPIRATION RATE: 18 BRPM | SYSTOLIC BLOOD PRESSURE: 129 MMHG | OXYGEN SATURATION: 96 % | BODY MASS INDEX: 28.44 KG/M2

## 2023-11-03 DIAGNOSIS — J30.9 ALLERGIC RHINITIS, UNSPECIFIED SEASONALITY, UNSPECIFIED TRIGGER: Primary | ICD-10-CM

## 2023-11-03 PROCEDURE — 3074F SYST BP LT 130 MM HG: CPT | Performed by: FAMILY MEDICINE

## 2023-11-03 PROCEDURE — 3079F DIAST BP 80-89 MM HG: CPT | Performed by: FAMILY MEDICINE

## 2023-11-03 PROCEDURE — 99213 OFFICE O/P EST LOW 20 MIN: CPT | Performed by: FAMILY MEDICINE

## 2023-11-03 RX ORDER — OLOPATADINE HYDROCHLORIDE 665 UG/1
2 SPRAY NASAL 2 TIMES DAILY
Qty: 1 EACH | Refills: 1 | Status: SHIPPED | OUTPATIENT
Start: 2023-11-03

## 2023-11-03 RX ORDER — PREDNISONE 10 MG/1
TABLET ORAL
Qty: 21 EACH | Refills: 0 | Status: SHIPPED | OUTPATIENT
Start: 2023-11-03

## 2023-11-03 ASSESSMENT — ENCOUNTER SYMPTOMS
SINUS PAIN: 0
COUGH: 1
SHORTNESS OF BREATH: 0
ABDOMINAL PAIN: 0
BLOOD IN STOOL: 0
RHINORRHEA: 1
CHEST TIGHTNESS: 0

## 2023-11-03 NOTE — PROGRESS NOTES
Genitourinary:  Negative for hematuria. Objective   Physical Exam  Vitals and nursing note reviewed. Constitutional:       General: He is not in acute distress. Appearance: Normal appearance. He is not ill-appearing. HENT:      Head: Normocephalic and atraumatic. Right Ear: External ear normal.      Left Ear: External ear normal.      Ears:      Comments: Retracted Tms BL     Nose: Rhinorrhea present. Comments: +PND     Mouth/Throat:      Mouth: Mucous membranes are moist.   Eyes:      General: No scleral icterus. Right eye: No discharge. Left eye: No discharge. Extraocular Movements: Extraocular movements intact. Pupils: Pupils are equal, round, and reactive to light. Cardiovascular:      Rate and Rhythm: Normal rate and regular rhythm. Pulses: Normal pulses. Heart sounds: No murmur heard. No friction rub. No gallop. Pulmonary:      Effort: Pulmonary effort is normal. No respiratory distress. Breath sounds: Normal breath sounds. Abdominal:      General: Abdomen is flat. Bowel sounds are normal.      Palpations: Abdomen is soft. Musculoskeletal:         General: No swelling or tenderness. Normal range of motion. Cervical back: Normal range of motion and neck supple. No rigidity. Right lower leg: No edema. Left lower leg: No edema. Lymphadenopathy:      Cervical: No cervical adenopathy. Skin:     General: Skin is warm and dry. Coloration: Skin is not pale. Neurological:      General: No focal deficit present. Mental Status: He is alert and oriented to person, place, and time. Mental status is at baseline. Psychiatric:         Mood and Affect: Mood normal.         Behavior: Behavior normal.         Thought Content: Thought content normal.                  An electronic signature was used to authenticate this note.     --Catarina Bean MD

## 2023-11-17 NOTE — PROGRESS NOTES
Anesthesia to review stress test results.
Dr. Genesis Davis reviewed chart. No order received. Ok to proceed.
RN called Pt to confirm appointment time of 1106, arrival time 0930, location,  requirement, and instructions for registration at the hospital.  Pt verbalized understanding.
VSS. Discharge instructions reviewed with patient and spouse and copy of instructions sent home with patient. Questions answered. Patient transferred out via wheelchair by endo staff. IV discontinued prior to discharge.
[id number]

## 2023-11-29 ENCOUNTER — PATIENT MESSAGE (OUTPATIENT)
Dept: FAMILY MEDICINE CLINIC | Facility: CLINIC | Age: 48
End: 2023-11-29

## 2023-11-29 NOTE — TELEPHONE ENCOUNTER
From: Ludy Alston  To: Dr. Julito Maki: 11/29/2023 3:38 PM EST  Subject: Sleep study    I still have not heard anything from my at home sleep study. They said they would contact you with the results. Is there anyway you could check on it for me?     Thanks,

## 2023-11-30 ENCOUNTER — TELEPHONE (OUTPATIENT)
Dept: SLEEP MEDICINE | Age: 48
End: 2023-11-30

## 2023-11-30 DIAGNOSIS — E29.1 HYPOGONADISM MALE: ICD-10-CM

## 2023-11-30 LAB
BASOPHILS # BLD: 0 K/UL (ref 0–0.2)
BASOPHILS NFR BLD: 0 % (ref 0–2)
DIFFERENTIAL METHOD BLD: ABNORMAL
EOSINOPHIL # BLD: 0.2 K/UL (ref 0–0.8)
EOSINOPHIL NFR BLD: 2 % (ref 0.5–7.8)
ERYTHROCYTE [DISTWIDTH] IN BLOOD BY AUTOMATED COUNT: 11.9 % (ref 11.9–14.6)
HCT VFR BLD AUTO: 49.2 % (ref 41.1–50.3)
HGB BLD-MCNC: 16.5 G/DL (ref 13.6–17.2)
IMM GRANULOCYTES # BLD AUTO: 0 K/UL (ref 0–0.5)
IMM GRANULOCYTES NFR BLD AUTO: 0 % (ref 0–5)
LYMPHOCYTES # BLD: 1.5 K/UL (ref 0.5–4.6)
LYMPHOCYTES NFR BLD: 18 % (ref 13–44)
MCH RBC QN AUTO: 29.3 PG (ref 26.1–32.9)
MCHC RBC AUTO-ENTMCNC: 33.5 G/DL (ref 31.4–35)
MCV RBC AUTO: 87.2 FL (ref 82–102)
MONOCYTES # BLD: 0.9 K/UL (ref 0.1–1.3)
MONOCYTES NFR BLD: 11 % (ref 4–12)
NEUTS SEG # BLD: 5.5 K/UL (ref 1.7–8.2)
NEUTS SEG NFR BLD: 69 % (ref 43–78)
NRBC # BLD: 0 K/UL (ref 0–0.2)
PLATELET # BLD AUTO: 285 K/UL (ref 150–450)
PMV BLD AUTO: 8.8 FL (ref 9.4–12.3)
PSA SERPL-MCNC: 1 NG/ML
RBC # BLD AUTO: 5.64 M/UL (ref 4.23–5.6)
WBC # BLD AUTO: 8.1 K/UL (ref 4.3–11.1)

## 2023-12-05 LAB
TESTOST FREE SERPL-MCNC: 7.3 PG/ML (ref 6.8–21.5)
TESTOST SERPL-MCNC: 106 NG/DL (ref 264–916)

## 2023-12-11 NOTE — PROGRESS NOTES
incidence of hypertension, coronary artery disease, congestive heart failure, pulmonary hypertension, gastroesophageal reflux, pathologic hypersomnolence, memory loss, and glucose intolerance was related to the consequences of hypoxemia, hypercapnia, airway obstruction, and sympathetic overdrive. We also discussed the ability of nasal CPAP to correct these abnormalities through maintenance of a patent airway. Therapeutic options including surgery, oral appliances, and weight loss were also reviewed. Patient is in agreement with starting CPAP for treatment. 2. Snoring  R06.83 Start CPAP. Positional therapy to assure you sleep in the side-lying position and avoid sleeping supine      3. Witnessed apneic spells  R06.81 Should resolve with CPAP      4. Non-restorative sleep  G47.8 Related to disordered breathing/sleep apnea preventing stage III and REM sleep and causing nighttime awakenings      5. Persistent disorder of initiating or maintaining sleep  G47.00 Nighttime awakenings should decrease with CPAP controlling WILFRIDO. PLAN:    Start CPAP 5-15 cm H2O with nightly compliance  New CPAP set up and supplies ordered  Recommendations as above  Follow-up in 4 months or sooner if needed         Orders Placed This Encounter   Procedures    [facility] - [address] [place] [address]  Phone: [phone] [facility] Weekdays [phone]  Dept: [phone]      Patient Name: Stefania Ruiz  : 1975  Gender: male  Address: [address] [place] [address]  Patient phone number: [phone] (home)       Primary Insurance: Payor: Ana Paula Davis / Plan: Gogo Montilla / Product Type: *No Product type* /   Subscriber ID: [id number] - (Chris GARCIA)      AMB Supply Order  Order Details     DME Location:  [facility]   Order Date: 2023   The primary encounter diagnosis was WILFRIDO (obstructive sleep apnea).  Diagnoses of Snoring, Witnessed apneic spells, Non-restorative

## 2023-12-12 ENCOUNTER — OFFICE VISIT (OUTPATIENT)
Dept: SLEEP MEDICINE | Age: 48
End: 2023-12-12
Payer: COMMERCIAL

## 2023-12-12 VITALS
WEIGHT: 191 LBS | HEIGHT: 69 IN | OXYGEN SATURATION: 99 % | TEMPERATURE: 98.2 F | HEART RATE: 99 BPM | DIASTOLIC BLOOD PRESSURE: 80 MMHG | SYSTOLIC BLOOD PRESSURE: 120 MMHG | BODY MASS INDEX: 28.29 KG/M2

## 2023-12-12 DIAGNOSIS — R06.81 WITNESSED APNEIC SPELLS: ICD-10-CM

## 2023-12-12 DIAGNOSIS — G47.33 OSA (OBSTRUCTIVE SLEEP APNEA): Primary | ICD-10-CM

## 2023-12-12 DIAGNOSIS — G47.00 PERSISTENT DISORDER OF INITIATING OR MAINTAINING SLEEP: ICD-10-CM

## 2023-12-12 DIAGNOSIS — G47.8 NON-RESTORATIVE SLEEP: ICD-10-CM

## 2023-12-12 DIAGNOSIS — R06.83 SNORING: ICD-10-CM

## 2023-12-12 PROCEDURE — 3079F DIAST BP 80-89 MM HG: CPT | Performed by: NURSE PRACTITIONER

## 2023-12-12 PROCEDURE — 3074F SYST BP LT 130 MM HG: CPT | Performed by: NURSE PRACTITIONER

## 2023-12-12 PROCEDURE — 99203 OFFICE O/P NEW LOW 30 MIN: CPT | Performed by: NURSE PRACTITIONER

## 2023-12-12 ASSESSMENT — SLEEP AND FATIGUE QUESTIONNAIRES
ESS TOTAL SCORE: 2
HOW LIKELY ARE YOU TO NOD OFF OR FALL ASLEEP WHILE LYING DOWN TO REST IN THE AFTERNOON WHEN CIRCUMSTANCES PERMIT: 1
HOW LIKELY ARE YOU TO NOD OFF OR FALL ASLEEP WHILE SITTING QUIETLY AFTER LUNCH WITHOUT ALCOHOL: 0
HOW LIKELY ARE YOU TO NOD OFF OR FALL ASLEEP WHILE WATCHING TV: 1
HOW LIKELY ARE YOU TO NOD OFF OR FALL ASLEEP WHILE SITTING AND TALKING TO SOMEONE: 0
HOW LIKELY ARE YOU TO NOD OFF OR FALL ASLEEP WHILE SITTING AND READING: 0
HOW LIKELY ARE YOU TO NOD OFF OR FALL ASLEEP WHILE SITTING INACTIVE IN A PUBLIC PLACE: 0
HOW LIKELY ARE YOU TO NOD OFF OR FALL ASLEEP IN A CAR, WHILE STOPPED FOR A FEW MINUTES IN TRAFFIC: 0
HOW LIKELY ARE YOU TO NOD OFF OR FALL ASLEEP WHEN YOU ARE A PASSENGER IN A CAR FOR AN HOUR WITHOUT A BREAK: 0

## 2024-01-24 ENCOUNTER — OFFICE VISIT (OUTPATIENT)
Dept: FAMILY MEDICINE CLINIC | Facility: CLINIC | Age: 49
End: 2024-01-24
Payer: COMMERCIAL

## 2024-01-24 VITALS — DIASTOLIC BLOOD PRESSURE: 88 MMHG | SYSTOLIC BLOOD PRESSURE: 130 MMHG

## 2024-01-24 DIAGNOSIS — E29.1 HYPOGONADISM MALE: ICD-10-CM

## 2024-01-24 DIAGNOSIS — J06.9 VIRAL URI: ICD-10-CM

## 2024-01-24 DIAGNOSIS — G47.33 OSA (OBSTRUCTIVE SLEEP APNEA): ICD-10-CM

## 2024-01-24 DIAGNOSIS — F33.1 MODERATE EPISODE OF RECURRENT MAJOR DEPRESSIVE DISORDER (HCC): Primary | ICD-10-CM

## 2024-01-24 LAB
BASOPHILS # BLD: 0 K/UL (ref 0–0.2)
BASOPHILS NFR BLD: 1 % (ref 0–2)
DIFFERENTIAL METHOD BLD: ABNORMAL
EOSINOPHIL # BLD: 0.3 K/UL (ref 0–0.8)
EOSINOPHIL NFR BLD: 4 % (ref 0.5–7.8)
ERYTHROCYTE [DISTWIDTH] IN BLOOD BY AUTOMATED COUNT: 12.4 % (ref 11.9–14.6)
HCT VFR BLD AUTO: 49.9 % (ref 41.1–50.3)
HGB BLD-MCNC: 16.4 G/DL (ref 13.6–17.2)
IMM GRANULOCYTES # BLD AUTO: 0 K/UL (ref 0–0.5)
IMM GRANULOCYTES NFR BLD AUTO: 0 % (ref 0–5)
LYMPHOCYTES NFR BLD: 24 % (ref 13–44)
MCH RBC QN AUTO: 29.5 PG (ref 26.1–32.9)
MCHC RBC AUTO-ENTMCNC: 32.9 G/DL (ref 31.4–35)
MCV RBC AUTO: 89.7 FL (ref 82–102)
MONOCYTES # BLD: 0.7 K/UL (ref 0.1–1.3)
MONOCYTES NFR BLD: 10 % (ref 4–12)
NEUTS SEG # BLD: 4.1 K/UL (ref 1.7–8.2)
NEUTS SEG NFR BLD: 61 % (ref 43–78)
NRBC # BLD: 0 K/UL (ref 0–0.2)
PLATELET # BLD AUTO: 282 K/UL (ref 150–450)
PMV BLD AUTO: 8.8 FL (ref 9.4–12.3)
RBC # BLD AUTO: 5.56 M/UL (ref 4.23–5.6)
WBC # BLD AUTO: 6.8 K/UL (ref 4.3–11.1)

## 2024-01-24 PROCEDURE — 99214 OFFICE O/P EST MOD 30 MIN: CPT | Performed by: FAMILY MEDICINE

## 2024-01-24 PROCEDURE — 3079F DIAST BP 80-89 MM HG: CPT | Performed by: FAMILY MEDICINE

## 2024-01-24 PROCEDURE — 3075F SYST BP GE 130 - 139MM HG: CPT | Performed by: FAMILY MEDICINE

## 2024-01-24 RX ORDER — ACETAMINOPHEN, CHLORPHENIRAMINE MALEATE, AND PHENYLEPHRINE HCL 325; 4; 10 MG/1; MG/1; MG/1
TABLET, MULTILAYER ORAL
Qty: 20 TABLET | Refills: 0 | Status: SHIPPED | OUTPATIENT
Start: 2024-01-24

## 2024-01-24 ASSESSMENT — ENCOUNTER SYMPTOMS
SHORTNESS OF BREATH: 0
BLOOD IN STOOL: 0
CHEST TIGHTNESS: 0

## 2024-01-24 NOTE — PROGRESS NOTES
[facility]  _______________________________________  MD Holli Crowe, DO Nimco Castillo, MD Breanna Miller MD    [address] [place] [id number]  Phone: [phone]  Fax: [phone]    Samir Earl (:  1975) is a 48 y.o. male,Established patient, here for evaluation of the following chief complaint(s):  Hypogonadism (Follow up /) and Allergies         ASSESSMENT/PLAN:    1. Moderate episode of recurrent major depressive disorder (HCC)  Stable, continue current regimen.   We talked about reducing dose to 10mg a day but he wants to get his T levels sorted first to see if  SE of SSRI still are an issue.     2. Hypogonadism male  Check blood viscosity and T levels, then start Androgel, will likely start two pumps each shoulder.   - CBC with Auto Differential; Future  - Testosterone, free, total; Future    3. WILFRIDO (obstructive sleep apnea)  Stable, compliant with PAP therapy. Continue indefinitely.     4. Viral URI  Does not look like allergies or bacterial infection on exam. Will try intermittent OTC decongestant. Warned him on rebound rhinitis if he uses it too many days in a row. Come back for recheck if symptoms worsen.   - Chlorphen-PE-Acetaminophen (NOREL AD) 4- MG TABS; Take one by mouth twice a day for congestion/sinus pressure  Dispense: 20 tablet; Refill: 0    FU 8w    Subjective   SUBJECTIVE/OBJECTIVE:      MDD: Now on Prozac 20 and has been referred to therapy.  Tried to come off prozac     Bpstill controlled without meds. Less stress in the home.     BP Readings from Last 3 Encounters:   24 130/88   23 120/80   23 129/81     Lab Results   Component Value Date/Time     2023 11:43 AM    K 4.1 2023 11:43 AM     2023 11:43 AM    CO2 30 2023 11:43 AM    BUN 10 2023 11:43 AM    CREATININE 1.20 2023 11:43 AM    GLUCOSE 87 2023 11:43 AM

## 2024-01-30 PROBLEM — J06.9 VIRAL URI: Status: ACTIVE | Noted: 2024-01-30

## 2024-02-04 LAB
TESTOST FREE SERPL-MCNC: 8.6 PG/ML (ref 6.8–21.5)
TESTOST SERPL-MCNC: 197 NG/DL (ref 264–916)

## 2024-02-25 DIAGNOSIS — F51.04 PSYCHOPHYSIOLOGIC INSOMNIA: ICD-10-CM

## 2024-02-25 DIAGNOSIS — F33.1 MODERATE EPISODE OF RECURRENT MAJOR DEPRESSIVE DISORDER (HCC): ICD-10-CM

## 2024-02-26 RX ORDER — FLUOXETINE HYDROCHLORIDE 20 MG/1
20 CAPSULE ORAL DAILY
Qty: 90 CAPSULE | Refills: 1 | Status: SHIPPED | OUTPATIENT
Start: 2024-02-26

## 2024-03-12 ENCOUNTER — TELEPHONE (OUTPATIENT)
Dept: FAMILY MEDICINE CLINIC | Facility: CLINIC | Age: 49
End: 2024-03-12

## 2024-03-12 NOTE — TELEPHONE ENCOUNTER
ROQUE Del Real called from [facility] regarding a request for Mr. Earl and testosterone.    She stated that the request has been \"upheld denial\".    Dr. Rich will be receiving a letter within 10 days explaining the insurance's decision but if there is any questions, you can call [phone].    Appeal Denial #[id number]    Thank you!

## 2024-03-28 NOTE — TELEPHONE ENCOUNTER
Will you confirm with him he's still taking this? This came directly from his pharmacy, doesn't look like he requested it. I did sign it. [FreeTextEntry1] :  Psychiatric family history: -Suicide: denied  -Anxiety disorder- none she aware of  -Mood disorders: mom diagnosed with "irritable depression", brother has bipolar disorder,   -Psychotic disorders: denied -Substance use disorders: denied

## 2024-04-08 DIAGNOSIS — E29.1 HYPOGONADISM MALE: Primary | ICD-10-CM

## 2024-04-09 DIAGNOSIS — E29.1 HYPOGONADISM MALE: ICD-10-CM

## 2024-04-13 LAB
TESTOST FREE SERPL-MCNC: 14.3 PG/ML (ref 6.8–21.5)
TESTOST SERPL-MCNC: 213 NG/DL (ref 264–916)

## 2024-04-15 ENCOUNTER — NURSE ONLY (OUTPATIENT)
Dept: FAMILY MEDICINE CLINIC | Facility: CLINIC | Age: 49
End: 2024-04-15

## 2024-04-15 DIAGNOSIS — E29.1 HYPOGONADISM MALE: ICD-10-CM

## 2024-04-15 DIAGNOSIS — E29.1 HYPOGONADISM MALE: Primary | ICD-10-CM

## 2024-04-18 LAB
TESTOST FREE SERPL-MCNC: 9.3 PG/ML (ref 6.8–21.5)
TESTOST SERPL-MCNC: 174 NG/DL (ref 264–916)

## 2024-04-19 DIAGNOSIS — E29.1 HYPOGONADISM MALE: ICD-10-CM

## 2024-04-19 RX ORDER — TESTOSTERONE 16.2 MG/G
2 GEL TRANSDERMAL DAILY
Qty: 3 EACH | Refills: 1 | Status: SHIPPED | OUTPATIENT
Start: 2024-04-19 | End: 2024-10-16

## 2024-05-22 ENCOUNTER — OFFICE VISIT (OUTPATIENT)
Dept: FAMILY MEDICINE CLINIC | Facility: CLINIC | Age: 49
End: 2024-05-22
Payer: COMMERCIAL

## 2024-05-22 VITALS
DIASTOLIC BLOOD PRESSURE: 100 MMHG | WEIGHT: 193 LBS | SYSTOLIC BLOOD PRESSURE: 144 MMHG | BODY MASS INDEX: 28.58 KG/M2 | HEIGHT: 69 IN

## 2024-05-22 DIAGNOSIS — F33.1 MODERATE EPISODE OF RECURRENT MAJOR DEPRESSIVE DISORDER (HCC): ICD-10-CM

## 2024-05-22 DIAGNOSIS — E29.1 HYPOGONADISM MALE: ICD-10-CM

## 2024-05-22 DIAGNOSIS — I10 BENIGN ESSENTIAL HTN: Primary | ICD-10-CM

## 2024-05-22 DIAGNOSIS — I10 BENIGN ESSENTIAL HTN: ICD-10-CM

## 2024-05-22 DIAGNOSIS — F51.04 PSYCHOPHYSIOLOGIC INSOMNIA: ICD-10-CM

## 2024-05-22 PROCEDURE — 3080F DIAST BP >= 90 MM HG: CPT | Performed by: FAMILY MEDICINE

## 2024-05-22 PROCEDURE — 99214 OFFICE O/P EST MOD 30 MIN: CPT | Performed by: FAMILY MEDICINE

## 2024-05-22 PROCEDURE — 3077F SYST BP >= 140 MM HG: CPT | Performed by: FAMILY MEDICINE

## 2024-05-22 RX ORDER — TRAZODONE HYDROCHLORIDE 100 MG/1
100 TABLET ORAL NIGHTLY
Qty: 90 TABLET | Refills: 1 | Status: SHIPPED | OUTPATIENT
Start: 2024-05-22 | End: 2024-05-22 | Stop reason: SDUPTHER

## 2024-05-22 RX ORDER — TRAZODONE HYDROCHLORIDE 100 MG/1
100 TABLET ORAL NIGHTLY
Qty: 90 TABLET | Refills: 1 | Status: SHIPPED | OUTPATIENT
Start: 2024-05-22

## 2024-05-22 RX ORDER — TRAZODONE HYDROCHLORIDE 100 MG/1
100 TABLET ORAL NIGHTLY
COMMUNITY

## 2024-05-22 ASSESSMENT — ENCOUNTER SYMPTOMS
CHEST TIGHTNESS: 0
SHORTNESS OF BREATH: 0
BLOOD IN STOOL: 0

## 2024-05-22 NOTE — PROGRESS NOTES
[facility]  _______________________________________  MD Holli Crowe, DO Nimco Castillo, MD Breanna Miller MD    [address] [place] [address]  Phone: [phone]  Fax: [phone]    Samir Earl (:  1975) is a 49 y.o. male,Established patient, here for evaluation of the following chief complaint(s):  Hypogonadism and Depression      Assessment & Plan   ASSESSMENT/PLAN:    1. Benign essential HTN  BP is high again, but he's under a LOT of stress, also his trazodone ran out 2 weeks ago and this is about the time he could feel his BP start running up. He's going to restart the TCA as below and log Bps and report back to me. If still running high, will get him back on something for BP.   - Comprehensive Metabolic Panel; Future    2. Psychophysiologic insomnia  Restarting trazodone. See above.   - traZODone (DESYREL) 100 MG tablet; Take 1 tablet by mouth nightly  Dispense: 90 tablet; Refill: 1    3. Moderate episode of recurrent major depressive disorder (HCC)  Stable, continue current regimen.       4. Hypogonadism male  Will come back for AM labs to check T, HCT, PSA. Will adjust androgel up if T still low.     - CBC with Auto Differential; Future  - PSA, Diagnostic; Future  - Testosterone, free, total; Future    FU 6m    Subjective   SUBJECTIVE/OBJECTIVE:      MDD: Now on Prozac 20 and has been referred to therapy.  Tried to come off prozac, still taking it, but now having a lot of anxiety. He does still feel sad at times.     Bpstill  was controlled without meds. Less stress in the home. But now on T, BP is back up. States he is very stressed out again. Had run out of trazodone,     BP Readings from Last 3 Encounters:   24 (!) 144/100   24 130/88   23 120/80     Lab Results   Component Value Date/Time     2023 11:43 AM    K 4.1 2023 11:43 AM     2023 11:43 AM    CO2 30

## 2024-05-23 DIAGNOSIS — I10 BENIGN ESSENTIAL HTN: Primary | ICD-10-CM

## 2024-05-23 DIAGNOSIS — E29.1 HYPOGONADISM MALE: ICD-10-CM

## 2024-06-12 DIAGNOSIS — E29.1 HYPOGONADISM MALE: ICD-10-CM

## 2024-06-12 DIAGNOSIS — I10 BENIGN ESSENTIAL HTN: ICD-10-CM

## 2024-06-12 LAB
ALBUMIN SERPL-MCNC: 4.3 G/DL (ref 3.5–5)
ALBUMIN/GLOB SERPL: 1.3 (ref 1–1.9)
ALP SERPL-CCNC: 75 U/L (ref 40–129)
ALT SERPL-CCNC: 21 U/L (ref 12–65)
ANION GAP SERPL CALC-SCNC: 10 MMOL/L (ref 9–18)
AST SERPL-CCNC: 22 U/L (ref 15–37)
BASOPHILS # BLD: 0 K/UL (ref 0–0.2)
BASOPHILS NFR BLD: 0 % (ref 0–2)
BILIRUB SERPL-MCNC: 0.8 MG/DL (ref 0–1.2)
BUN SERPL-MCNC: 11 MG/DL (ref 6–23)
CALCIUM SERPL-MCNC: 9.6 MG/DL (ref 8.8–10.2)
CHLORIDE SERPL-SCNC: 101 MMOL/L (ref 98–107)
CO2 SERPL-SCNC: 29 MMOL/L (ref 20–28)
CREAT SERPL-MCNC: 1.17 MG/DL (ref 0.8–1.3)
DIFFERENTIAL METHOD BLD: ABNORMAL
EOSINOPHIL # BLD: 0.2 K/UL (ref 0–0.8)
EOSINOPHIL NFR BLD: 5 % (ref 0.5–7.8)
ERYTHROCYTE [DISTWIDTH] IN BLOOD BY AUTOMATED COUNT: 12.5 % (ref 11.9–14.6)
GLOBULIN SER CALC-MCNC: 3.4 G/DL (ref 2.3–3.5)
GLUCOSE SERPL-MCNC: 99 MG/DL (ref 70–99)
HCT VFR BLD AUTO: 49.8 % (ref 41.1–50.3)
HGB BLD-MCNC: 16.1 G/DL (ref 13.6–17.2)
IMM GRANULOCYTES # BLD AUTO: 0 K/UL (ref 0–0.5)
IMM GRANULOCYTES NFR BLD AUTO: 0 % (ref 0–5)
LYMPHOCYTES # BLD: 1.1 K/UL (ref 0.5–4.6)
LYMPHOCYTES NFR BLD: 23 % (ref 13–44)
MCH RBC QN AUTO: 29.1 PG (ref 26.1–32.9)
MCHC RBC AUTO-ENTMCNC: 32.3 G/DL (ref 31.4–35)
MCV RBC AUTO: 90.1 FL (ref 82–102)
MONOCYTES # BLD: 0.5 K/UL (ref 0.1–1.3)
MONOCYTES NFR BLD: 9 % (ref 4–12)
NEUTS SEG # BLD: 3.1 K/UL (ref 1.7–8.2)
NEUTS SEG NFR BLD: 63 % (ref 43–78)
NRBC # BLD: 0 K/UL (ref 0–0.2)
PLATELET # BLD AUTO: 256 K/UL (ref 150–450)
PMV BLD AUTO: 9 FL (ref 9.4–12.3)
POTASSIUM SERPL-SCNC: 4.5 MMOL/L (ref 3.5–5.1)
PROT SERPL-MCNC: 7.7 G/DL (ref 6.3–8.2)
PSA SERPL-MCNC: 0.8 NG/ML (ref 0–4)
RBC # BLD AUTO: 5.53 M/UL (ref 4.23–5.6)
SODIUM SERPL-SCNC: 140 MMOL/L (ref 136–145)
WBC # BLD AUTO: 5 K/UL (ref 4.3–11.1)

## 2024-06-16 LAB
TESTOST FREE SERPL-MCNC: 11.7 PG/ML (ref 6.8–21.5)
TESTOST SERPL-MCNC: 224 NG/DL (ref 264–916)

## 2024-08-27 DIAGNOSIS — F33.1 MODERATE EPISODE OF RECURRENT MAJOR DEPRESSIVE DISORDER (HCC): ICD-10-CM

## 2024-08-27 DIAGNOSIS — F51.04 PSYCHOPHYSIOLOGIC INSOMNIA: ICD-10-CM

## 2024-08-28 DIAGNOSIS — F51.04 PSYCHOPHYSIOLOGIC INSOMNIA: ICD-10-CM

## 2024-08-28 DIAGNOSIS — F33.1 MODERATE EPISODE OF RECURRENT MAJOR DEPRESSIVE DISORDER (HCC): ICD-10-CM

## 2024-11-25 DIAGNOSIS — E29.1 HYPOGONADISM MALE: ICD-10-CM

## 2024-11-25 RX ORDER — TRAZODONE HYDROCHLORIDE 100 MG/1
100 TABLET ORAL NIGHTLY
Qty: 90 TABLET | Refills: 1 | OUTPATIENT
Start: 2024-11-25

## 2025-06-04 DIAGNOSIS — F33.1 MODERATE EPISODE OF RECURRENT MAJOR DEPRESSIVE DISORDER (HCC): ICD-10-CM

## 2025-06-04 DIAGNOSIS — F51.04 PSYCHOPHYSIOLOGIC INSOMNIA: ICD-10-CM

## 2025-06-04 RX ORDER — TRAZODONE HYDROCHLORIDE 100 MG/1
100 TABLET ORAL NIGHTLY
Qty: 90 TABLET | Refills: 1 | OUTPATIENT
Start: 2025-06-04

## (undated) DEVICE — CANNULA NSL ORAL AD FOR CAPNOFLEX CO2 O2 AIRLFE

## (undated) DEVICE — KENDALL RADIOLUCENT FOAM MONITORING ELECTRODE RECTANGULAR SHAPE: Brand: KENDALL

## (undated) DEVICE — CONNECTOR TBNG OD5-7MM O2 END DISP

## (undated) DEVICE — SYRINGE MED 5ML STD CLR PLAS LUERLOCK TIP N CTRL DISP

## (undated) DEVICE — SYRINGE MED 3ML CLR PLAS STD N CTRL LUERLOCK TIP DISP

## (undated) DEVICE — GLOVE SURG SZ 75 CRM LTX FREE POLYISOPRENE POLYMER BEAD ANTI

## (undated) DEVICE — NEEDLE SYR 18GA L1.5IN RED PLAS HUB S STL BLNT FILL W/O